# Patient Record
Sex: FEMALE | Race: WHITE | NOT HISPANIC OR LATINO | URBAN - METROPOLITAN AREA
[De-identification: names, ages, dates, MRNs, and addresses within clinical notes are randomized per-mention and may not be internally consistent; named-entity substitution may affect disease eponyms.]

---

## 2024-10-24 ENCOUNTER — INPATIENT (INPATIENT)
Facility: HOSPITAL | Age: 49
LOS: 4 days | Discharge: HOME | DRG: 66 | End: 2024-10-29
Attending: INTERNAL MEDICINE | Admitting: INTERNAL MEDICINE
Payer: COMMERCIAL

## 2024-10-24 VITALS
SYSTOLIC BLOOD PRESSURE: 194 MMHG | HEIGHT: 68 IN | RESPIRATION RATE: 22 BRPM | HEART RATE: 139 BPM | TEMPERATURE: 98 F | DIASTOLIC BLOOD PRESSURE: 104 MMHG | WEIGHT: 248.02 LBS | OXYGEN SATURATION: 100 %

## 2024-10-24 DIAGNOSIS — I63.9 CEREBRAL INFARCTION, UNSPECIFIED: ICD-10-CM

## 2024-10-24 DIAGNOSIS — Z29.9 ENCOUNTER FOR PROPHYLACTIC MEASURES, UNSPECIFIED: ICD-10-CM

## 2024-10-24 DIAGNOSIS — E11.9 TYPE 2 DIABETES MELLITUS WITHOUT COMPLICATIONS: ICD-10-CM

## 2024-10-24 DIAGNOSIS — E66.01 MORBID (SEVERE) OBESITY DUE TO EXCESS CALORIES: ICD-10-CM

## 2024-10-24 DIAGNOSIS — I10 ESSENTIAL (PRIMARY) HYPERTENSION: ICD-10-CM

## 2024-10-24 LAB
ALBUMIN SERPL ELPH-MCNC: 4.4 G/DL — SIGNIFICANT CHANGE UP (ref 3.3–5)
ALP SERPL-CCNC: 75 U/L — SIGNIFICANT CHANGE UP (ref 40–120)
ALT FLD-CCNC: 17 U/L — SIGNIFICANT CHANGE UP (ref 10–45)
ANION GAP SERPL CALC-SCNC: 16 MMOL/L — SIGNIFICANT CHANGE UP (ref 5–17)
APPEARANCE UR: CLEAR — SIGNIFICANT CHANGE UP
APTT BLD: 30.6 SEC — SIGNIFICANT CHANGE UP (ref 24.5–35.6)
AST SERPL-CCNC: 9 U/L — LOW (ref 10–40)
BACTERIA # UR AUTO: ABNORMAL /HPF
BASOPHILS # BLD AUTO: 0.05 K/UL — SIGNIFICANT CHANGE UP (ref 0–0.2)
BASOPHILS NFR BLD AUTO: 0.6 % — SIGNIFICANT CHANGE UP (ref 0–2)
BILIRUB SERPL-MCNC: 0.5 MG/DL — SIGNIFICANT CHANGE UP (ref 0.2–1.2)
BILIRUB UR-MCNC: NEGATIVE — SIGNIFICANT CHANGE UP
BUN SERPL-MCNC: 10 MG/DL — SIGNIFICANT CHANGE UP (ref 7–23)
CALCIUM SERPL-MCNC: 9.6 MG/DL — SIGNIFICANT CHANGE UP (ref 8.4–10.5)
CAST: 0 /LPF — SIGNIFICANT CHANGE UP (ref 0–4)
CHLORIDE SERPL-SCNC: 95 MMOL/L — LOW (ref 96–108)
CO2 SERPL-SCNC: 22 MMOL/L — SIGNIFICANT CHANGE UP (ref 22–31)
COLOR SPEC: YELLOW — SIGNIFICANT CHANGE UP
CREAT SERPL-MCNC: 0.56 MG/DL — SIGNIFICANT CHANGE UP (ref 0.5–1.3)
DIFF PNL FLD: NEGATIVE — SIGNIFICANT CHANGE UP
EGFR: 113 ML/MIN/1.73M2 — SIGNIFICANT CHANGE UP
EOSINOPHIL # BLD AUTO: 0.04 K/UL — SIGNIFICANT CHANGE UP (ref 0–0.5)
EOSINOPHIL NFR BLD AUTO: 0.5 % — SIGNIFICANT CHANGE UP (ref 0–6)
FLUAV AG NPH QL: SIGNIFICANT CHANGE UP
FLUBV AG NPH QL: SIGNIFICANT CHANGE UP
GAS PNL BLDV: SIGNIFICANT CHANGE UP
GLUCOSE BLDC GLUCOMTR-MCNC: 203 MG/DL — HIGH (ref 70–99)
GLUCOSE BLDC GLUCOMTR-MCNC: 287 MG/DL — HIGH (ref 70–99)
GLUCOSE SERPL-MCNC: 467 MG/DL — CRITICAL HIGH (ref 70–99)
GLUCOSE UR QL: >=1000 MG/DL
HCT VFR BLD CALC: 46.7 % — HIGH (ref 34.5–45)
HGB BLD-MCNC: 15 G/DL — SIGNIFICANT CHANGE UP (ref 11.5–15.5)
IMM GRANULOCYTES NFR BLD AUTO: 0.6 % — SIGNIFICANT CHANGE UP (ref 0–0.9)
INR BLD: 1 RATIO — SIGNIFICANT CHANGE UP (ref 0.85–1.16)
KETONES UR-MCNC: 15 MG/DL
LEUKOCYTE ESTERASE UR-ACNC: NEGATIVE — SIGNIFICANT CHANGE UP
LYMPHOCYTES # BLD AUTO: 1.41 K/UL — SIGNIFICANT CHANGE UP (ref 1–3.3)
LYMPHOCYTES # BLD AUTO: 16.5 % — SIGNIFICANT CHANGE UP (ref 13–44)
MCHC RBC-ENTMCNC: 26.1 PG — LOW (ref 27–34)
MCHC RBC-ENTMCNC: 32.1 GM/DL — SIGNIFICANT CHANGE UP (ref 32–36)
MCV RBC AUTO: 81.2 FL — SIGNIFICANT CHANGE UP (ref 80–100)
MONOCYTES # BLD AUTO: 0.47 K/UL — SIGNIFICANT CHANGE UP (ref 0–0.9)
MONOCYTES NFR BLD AUTO: 5.5 % — SIGNIFICANT CHANGE UP (ref 2–14)
NEUTROPHILS # BLD AUTO: 6.52 K/UL — SIGNIFICANT CHANGE UP (ref 1.8–7.4)
NEUTROPHILS NFR BLD AUTO: 76.3 % — SIGNIFICANT CHANGE UP (ref 43–77)
NITRITE UR-MCNC: NEGATIVE — SIGNIFICANT CHANGE UP
NRBC # BLD: 0 /100 WBCS — SIGNIFICANT CHANGE UP (ref 0–0)
PH UR: 6.5 — SIGNIFICANT CHANGE UP (ref 5–8)
PLATELET # BLD AUTO: 263 K/UL — SIGNIFICANT CHANGE UP (ref 150–400)
POTASSIUM SERPL-MCNC: 3.5 MMOL/L — SIGNIFICANT CHANGE UP (ref 3.5–5.3)
POTASSIUM SERPL-SCNC: 3.5 MMOL/L — SIGNIFICANT CHANGE UP (ref 3.5–5.3)
PROT SERPL-MCNC: 7.8 G/DL — SIGNIFICANT CHANGE UP (ref 6–8.3)
PROT UR-MCNC: 100 MG/DL
PROTHROM AB SERPL-ACNC: 11.4 SEC — SIGNIFICANT CHANGE UP (ref 9.9–13.4)
RBC # BLD: 5.75 M/UL — HIGH (ref 3.8–5.2)
RBC # FLD: 14.9 % — HIGH (ref 10.3–14.5)
RBC CASTS # UR COMP ASSIST: 2 /HPF — SIGNIFICANT CHANGE UP (ref 0–4)
RSV RNA NPH QL NAA+NON-PROBE: SIGNIFICANT CHANGE UP
SARS-COV-2 RNA SPEC QL NAA+PROBE: SIGNIFICANT CHANGE UP
SODIUM SERPL-SCNC: 133 MMOL/L — LOW (ref 135–145)
SP GR SPEC: >1.03 — HIGH (ref 1–1.03)
SQUAMOUS # UR AUTO: 5 /HPF — SIGNIFICANT CHANGE UP (ref 0–5)
TROPONIN T, HIGH SENSITIVITY RESULT: <6 NG/L — SIGNIFICANT CHANGE UP (ref 0–51)
UROBILINOGEN FLD QL: 0.2 MG/DL — SIGNIFICANT CHANGE UP (ref 0.2–1)
WBC # BLD: 8.54 K/UL — SIGNIFICANT CHANGE UP (ref 3.8–10.5)
WBC # FLD AUTO: 8.54 K/UL — SIGNIFICANT CHANGE UP (ref 3.8–10.5)
WBC UR QL: 1 /HPF — SIGNIFICANT CHANGE UP (ref 0–5)

## 2024-10-24 PROCEDURE — 70450 CT HEAD/BRAIN W/O DYE: CPT | Mod: 26,MC,59

## 2024-10-24 PROCEDURE — 99231 SBSQ HOSP IP/OBS SF/LOW 25: CPT

## 2024-10-24 PROCEDURE — 99223 1ST HOSP IP/OBS HIGH 75: CPT

## 2024-10-24 PROCEDURE — 93010 ELECTROCARDIOGRAM REPORT: CPT

## 2024-10-24 PROCEDURE — 71046 X-RAY EXAM CHEST 2 VIEWS: CPT | Mod: 26

## 2024-10-24 PROCEDURE — 99285 EMERGENCY DEPT VISIT HI MDM: CPT

## 2024-10-24 PROCEDURE — 70498 CT ANGIOGRAPHY NECK: CPT | Mod: 26,MC

## 2024-10-24 PROCEDURE — 70496 CT ANGIOGRAPHY HEAD: CPT | Mod: 26,MC

## 2024-10-24 RX ORDER — INSULIN LISPRO 100/ML
VIAL (ML) SUBCUTANEOUS
Refills: 0 | Status: DISCONTINUED | OUTPATIENT
Start: 2024-10-24 | End: 2024-10-29

## 2024-10-24 RX ORDER — LISINOPRIL 40 MG
5 TABLET ORAL DAILY
Refills: 0 | Status: DISCONTINUED | OUTPATIENT
Start: 2024-10-24 | End: 2024-10-25

## 2024-10-24 RX ORDER — SODIUM CHLORIDE 9 MG/ML
1000 INJECTION, SOLUTION INTRAMUSCULAR; INTRAVENOUS; SUBCUTANEOUS ONCE
Refills: 0 | Status: COMPLETED | OUTPATIENT
Start: 2024-10-24 | End: 2024-10-24

## 2024-10-24 RX ORDER — CLOPIDOGREL 75 MG/1
75 TABLET ORAL DAILY
Refills: 0 | Status: DISCONTINUED | OUTPATIENT
Start: 2024-10-24 | End: 2024-10-29

## 2024-10-24 RX ORDER — CETIRIZINE HCL 10 MG/1
1 TABLET ORAL
Refills: 0 | DISCHARGE

## 2024-10-24 RX ORDER — MAGNESIUM, ALUMINUM HYDROXIDE 200-200 MG
30 TABLET,CHEWABLE ORAL EVERY 4 HOURS
Refills: 0 | Status: DISCONTINUED | OUTPATIENT
Start: 2024-10-24 | End: 2024-10-29

## 2024-10-24 RX ORDER — LABETALOL HCL 200 MG
10 TABLET ORAL ONCE
Refills: 0 | Status: COMPLETED | OUTPATIENT
Start: 2024-10-24 | End: 2024-10-24

## 2024-10-24 RX ORDER — ONDANSETRON HYDROCHLORIDE 2 MG/ML
4 INJECTION, SOLUTION INTRAMUSCULAR; INTRAVENOUS EVERY 8 HOURS
Refills: 0 | Status: DISCONTINUED | OUTPATIENT
Start: 2024-10-24 | End: 2024-10-29

## 2024-10-24 RX ORDER — INSULIN LISPRO 100/ML
5 VIAL (ML) SUBCUTANEOUS ONCE
Refills: 0 | Status: COMPLETED | OUTPATIENT
Start: 2024-10-24 | End: 2024-10-24

## 2024-10-24 RX ORDER — GLUCAGON INJECTION, SOLUTION 1 MG/.2ML
1 INJECTION, SOLUTION SUBCUTANEOUS ONCE
Refills: 0 | Status: DISCONTINUED | OUTPATIENT
Start: 2024-10-24 | End: 2024-10-29

## 2024-10-24 RX ORDER — INSULIN LISPRO 100/ML
VIAL (ML) SUBCUTANEOUS AT BEDTIME
Refills: 0 | Status: DISCONTINUED | OUTPATIENT
Start: 2024-10-24 | End: 2024-10-26

## 2024-10-24 RX ORDER — NICOTINE POLACRILEX 4 MG/1
1 GUM, CHEWING ORAL DAILY
Refills: 0 | Status: DISCONTINUED | OUTPATIENT
Start: 2024-10-24 | End: 2024-10-29

## 2024-10-24 RX ORDER — ENOXAPARIN SODIUM 40MG/0.4ML
40 SYRINGE (ML) SUBCUTANEOUS EVERY 24 HOURS
Refills: 0 | Status: DISCONTINUED | OUTPATIENT
Start: 2024-10-24 | End: 2024-10-29

## 2024-10-24 RX ORDER — CLOPIDOGREL 75 MG/1
75 TABLET ORAL ONCE
Refills: 0 | Status: COMPLETED | OUTPATIENT
Start: 2024-10-24 | End: 2024-10-24

## 2024-10-24 RX ORDER — ASPIRIN/MAG CARB/ALUMINUM AMIN 325 MG
1 TABLET ORAL
Refills: 0 | DISCHARGE

## 2024-10-24 RX ORDER — ACETAMINOPHEN 500 MG
650 TABLET ORAL EVERY 6 HOURS
Refills: 0 | Status: DISCONTINUED | OUTPATIENT
Start: 2024-10-24 | End: 2024-10-29

## 2024-10-24 RX ORDER — ONDANSETRON HYDROCHLORIDE 2 MG/ML
4 INJECTION, SOLUTION INTRAMUSCULAR; INTRAVENOUS ONCE
Refills: 0 | Status: COMPLETED | OUTPATIENT
Start: 2024-10-24 | End: 2024-10-24

## 2024-10-24 RX ORDER — HYDRALAZINE HYDROCHLORIDE 50 MG/1
10 TABLET, FILM COATED ORAL ONCE
Refills: 0 | Status: COMPLETED | OUTPATIENT
Start: 2024-10-24 | End: 2024-10-24

## 2024-10-24 RX ORDER — MELATONIN 5 MG
3 TABLET ORAL AT BEDTIME
Refills: 0 | Status: DISCONTINUED | OUTPATIENT
Start: 2024-10-24 | End: 2024-10-29

## 2024-10-24 RX ORDER — ASPIRIN/MAG CARB/ALUMINUM AMIN 325 MG
81 TABLET ORAL DAILY
Refills: 0 | Status: DISCONTINUED | OUTPATIENT
Start: 2024-10-24 | End: 2024-10-29

## 2024-10-24 RX ADMIN — ONDANSETRON HYDROCHLORIDE 4 MILLIGRAM(S): 2 INJECTION, SOLUTION INTRAMUSCULAR; INTRAVENOUS at 21:13

## 2024-10-24 RX ADMIN — Medication 1: at 23:39

## 2024-10-24 RX ADMIN — SODIUM CHLORIDE 1000 MILLILITER(S): 9 INJECTION, SOLUTION INTRAMUSCULAR; INTRAVENOUS; SUBCUTANEOUS at 11:39

## 2024-10-24 RX ADMIN — Medication 80 MILLIGRAM(S): at 23:40

## 2024-10-24 RX ADMIN — SODIUM CHLORIDE 1000 MILLILITER(S): 9 INJECTION, SOLUTION INTRAMUSCULAR; INTRAVENOUS; SUBCUTANEOUS at 19:00

## 2024-10-24 RX ADMIN — ONDANSETRON HYDROCHLORIDE 4 MILLIGRAM(S): 2 INJECTION, SOLUTION INTRAMUSCULAR; INTRAVENOUS at 11:39

## 2024-10-24 RX ADMIN — Medication 10 MILLIGRAM(S): at 18:06

## 2024-10-24 RX ADMIN — CLOPIDOGREL 75 MILLIGRAM(S): 75 TABLET ORAL at 15:46

## 2024-10-24 RX ADMIN — Medication 10 MILLIGRAM(S): at 19:23

## 2024-10-24 RX ADMIN — Medication 5 MILLIGRAM(S): at 23:40

## 2024-10-24 RX ADMIN — Medication 3 MILLIGRAM(S): at 23:40

## 2024-10-24 RX ADMIN — Medication 5 UNIT(S): at 18:35

## 2024-10-24 RX ADMIN — HYDRALAZINE HYDROCHLORIDE 10 MILLIGRAM(S): 50 TABLET, FILM COATED ORAL at 20:23

## 2024-10-24 NOTE — ED PROVIDER NOTE - PROGRESS NOTE DETAILS
Dr. Jenifer Barnhart, ATTENDING: Patient reassessed at bedside.  No new acute complaints.   CT results discussed. Neurology consulted. will await recommendations  will hold off on BP medications until neuro evaluates pt. Received signout pending CT read and neuro recs to be finalized briefly 48-year-old with hypertension diabetes untreated presented with ataxia and slurred speech outside of the thrombolytic window - Duarte Guerra MD attending physician

## 2024-10-24 NOTE — ED PROVIDER NOTE - CLINICAL SUMMARY MEDICAL DECISION MAKING FREE TEXT BOX
Braulio Irving MD PGY-3:  48-year-old female with PMH prediabetes, hypertension (never on antihypertensive medications) presents with 2 days of slurred speech.   at bedside also endorses that the patient speech is not as clear as it normally is.  Patient also states that she feels like she is leaning towards the left side when she walks and feels little off balance for the past 2 days since Tuesday.   thinks that patient has a sinus infection that she went to urgent care again on Sunday with some kids and had some congestion.  Patient has not seen a primary care doctor in years but made an appointment for this following Monday.  Patient also endorses some trouble swallowing since Tuesday.  Denies odynophagia.  Denies fever, chills, cough, chest pain, shortness of breath, vomiting, abdominal pain, dysuria, constipation, diarrhea.  Endorses nausea after eating meals since Tuesday.  Denies abdominal pain. Smokes 3 cigarettes daily no alcohol or other drug use.    GENERAL: well appearing in no acute distress, non-toxic appearing  HEAD: normocephalic, atraumatic  HEENT: normal conjunctiva, oral mucosa moist, uvula midline, no tonsilar exudates, slightly erythematous posterior oropharynx  CARDIAC: tachycardic rate and reg rhythm, normal S1S2, no appreciable murmurs, 2+ pulses in UE b/l  PULM: normal breath sounds, clear to ascultation bilaterally, no rales, rhonchi, wheezing  GI: abdomen nondistended, soft, nontender, no guarding, rebound tenderness  :  no suprapubic tenderness  NEURO: no gross motor or sensory deficits, CN2-12 grossly intact, speech appears normal but different per patient and , PERRL, EOMI, normal gait, AAOx3, no dysmetria, 5/5 strength in UE and LE b/l  MSK: no peripheral edema, no calf tenderness b/l  SKIN: well-perfused, extremities warm, no visible rashes  PSYCH: appropriate mood and affect    Pt likely has untreated hypertension will workup for hypertensive emergency. Will obtain CTH imaging given slurred speech symptoms. No facial droop noticed by me nor the  at bedside. Will obtain infectious workup with viral swab, cxr, urine. Likely neuro consult. Dispo pending results.

## 2024-10-24 NOTE — CONSULT NOTE ADULT - ASSESSMENT
47 yo right handed female with PMH of prediabetes and HTN not on medications presents with slurred speech, left hand weakness, loss of balance with falling to the left, and choking on liquids since waking up 2 days ago. Symptoms have gradually improved and now only complains of mild slurred speech, difficulty swallowing, and left finger clumsiness. Also noticed some difficulty with reading where she would skip over some words when reading fast. No gait disturbance Patient denies history of stroke or similar symptoms. Patient is a currently smoker for 20 years, 5-6 cigarettes a day. She has not followed up with a PCP in a long time to check her A1c or BP. CTH shows subacute/acute R basal ganglia stroke and L pontine stroke. CTA shows no LVO or significant stenosis.      mRS: 0  LKN: 10/21  NIHSS: 0    Impression: Patient presents with dysarthria, dysphagia, L hand weakness, and ataxia vs mild LLE weakness due to acute R basal ganglia ischemic stroke. Mechanism likely small vessel disease.    Recommendations:  [] Patient can complete rest of workup outpatient  [] MRI brain without contrast   [] TTE w/ bubble   [] Lipid panel, HbA1c  [] CBC, CMP, coagulation panel, troponin  [] ASA 81 mg and plavix 75mg QD for 21 days then continue with aspirin only  [] Atorvastatin 80mg (titrate to LDL < 70)   []  on smoking cessation  [] Lovenox SQ for DVT prophylaxis   [] NPO unless passes dysphagia screen; swallow eval if fails  [] BP goal normotension at <130/80  [] Telemonitoring  [] Neurological checks and vital signs Q4  [] BGM goals 140-180  [] PT/OT; S/S evaluation    * Case and plan not final until Attending attestation * 47 yo right handed female with PMH of prediabetes and HTN not on medications presents with slurred speech, left hand weakness, loss of balance with falling to the left, and choking on liquids since waking up 2 days ago. Symptoms have gradually improved and now only complains of mild slurred speech, difficulty swallowing, and left finger clumsiness. Also noticed some difficulty with reading where she would skip over some words when reading fast. No gait disturbance Patient denies history of stroke or similar symptoms. Patient is a currently smoker for 20 years, 5-6 cigarettes a day. She has not followed up with a PCP in a long time to check her A1c or BP. CTH shows subacute/acute R basal ganglia stroke and L pontine stroke. CTA shows no LVO or significant stenosis.      mRS: 0  LKN: 10/21  NIHSS: 0    Impression: Patient presents with dysarthria, dysphagia, L hand weakness, and ataxia vs mild LLE weakness due to acute R basal ganglia ischemic stroke. Mechanism likely small vessel disease.    Recommendations:  [] MRI brain without contrast   [] TTE w/ bubble   [] Lipid panel, HbA1c  [] CBC, CMP, coagulation panel, troponin  [] ASA 81 mg and plavix 75mg QD for 21 days then continue with aspirin only  [] Atorvastatin 80mg (titrate to LDL < 70)   []  on smoking cessation  [] Lovenox SQ for DVT prophylaxis   [] NPO unless passes dysphagia screen; swallow eval if fails  [] BP goal normotension at <130/80  [] Telemonitoring  [] Neurological checks and vital signs Q4  [] BGM goals 140-180  [] PT/OT; S/S evaluation    * Case and plan not final until Attending attestation * 47 yo right handed female with PMH of prediabetes and HTN not on medications presents with slurred speech, left hand weakness, loss of balance with falling to the left, and choking on liquids since waking up 2 days ago. Symptoms have gradually improved and now only complains of mild slurred speech, difficulty swallowing, and left finger clumsiness. Also noticed some difficulty with reading where she would skip over some words when reading fast. No gait disturbance Patient denies history of stroke or similar symptoms. Patient is a currently smoker for 20 years, 5-6 cigarettes a day. She has not followed up with a PCP in a long time to check her A1c or BP. CTH shows subacute/acute R basal ganglia stroke and L pontine stroke. CTA shows no LVO or significant stenosis.      mRS: 0  LKN: 10/21  NIHSS: 0    Impression: Patient presents with dysarthria, dysphagia, L hand weakness, and ataxia vs mild LLE weakness due to acute R basal ganglia ischemic stroke. Mechanism likely small vessel disease.    Recommendations:  [] MRI brain without contrast   [] TTE w/ bubble   [] Lipid panel, HbA1c  [] CBC, CMP, coagulation panel, troponin  [] Load plavix 300mg now  [] ASA 81 mg and plavix 75mg QD for 21 days then continue with aspirin only  [] Atorvastatin 80mg (titrate to LDL < 70)   []  on smoking cessation  [] Lovenox SQ for DVT prophylaxis   [] NPO unless passes dysphagia screen; swallow eval if fails  [] BP goal normotension at <130/80  [] Telemonitoring  [] Neurological checks and vital signs Q4  [] BGM goals 140-180  [] PT/OT; S/S evaluation    * Case and plan not final until Attending attestation * 47 yo right handed female with PMH of prediabetes and HTN not on medications presents with slurred speech, left hand weakness, loss of balance with falling to the left, and choking on liquids since waking up 2 days ago. Symptoms have gradually improved and now only complains of mild slurred speech, difficulty swallowing, and left finger clumsiness. Also noticed some difficulty with reading where she would skip over some words when reading fast. No gait disturbance Patient denies history of stroke or similar symptoms. Patient is a currently smoker for 20 years, 5-6 cigarettes a day. She has not followed up with a PCP in a long time to check her A1c or BP. CTH shows subacute/acute R basal ganglia stroke and L pontine stroke. CTA shows no LVO or significant stenosis.      mRS: 0  LKN: 10/21  NIHSS: 0    Impression: Patient presents with dysarthria, dysphagia, L hand weakness, and ataxia vs mild LLE weakness due to acute R basal ganglia ischemic stroke. Mechanism likely small vessel disease.    Recommendations:  [] MRI brain without contrast   [] TTE w/ bubble   [] Lipid panel, HbA1c  [] CBC, CMP, coagulation panel, troponin  [] Load plavix 300mg now  [] ASA 81 mg and plavix 75mg QD for 21 days then continue with aspirin only  [] Atorvastatin 80mg (titrate to LDL < 70)   []  on smoking cessation  [] Lovenox SQ for DVT prophylaxis   [] NPO unless passes dysphagia screen; swallow eval if fails  [] BP goal normotension at <130/80  [] Telemonitoring  [] Neurological checks and vital signs Q4  [] BGM goals 140-180  [] PT/OT; S/S evaluation    Discussed with stroke fellow Gui Fay. Case and plan not final until Attending attestation *

## 2024-10-24 NOTE — ED ADULT NURSE NOTE - NSFALLUNIVINTERV_ED_ALL_ED
Bed/Stretcher in lowest position, wheels locked, appropriate side rails in place/Call bell, personal items and telephone in reach/Instruct patient to call for assistance before getting out of bed/chair/stretcher/Non-slip footwear applied when patient is off stretcher/[place] to call system/Physically safe environment - no spills, clutter or unnecessary equipment/Purposeful proactive rounding/Room/bathroom lighting operational, light cord in reach

## 2024-10-24 NOTE — ED ADULT NURSE NOTE - CHIEF COMPLAINT QUOTE
slurred speech, off balance x few days, recently c/o sinus infection, L facial droop at rest, no pronator drift, maex4, strong, steady gait, +smoker

## 2024-10-24 NOTE — ED ADULT NURSE REASSESSMENT NOTE - NS ED NURSE REASSESS COMMENT FT1
Flow RN Merlyn made aware about pt bp being elevated at this time and unstable to be moved to [place]. ED team made aware. Awaiting orders. Pt denying any symptoms of c/p, SOB, HA, dizziness, n/v/d. numbness, tingling. Pt only endorsing slurred speech which was her reason for ER visit.
Pt remians elevated post bp meds. MD Aristides Ramsey made aware. Awaiting orders at this time. Awaiting pt bp to become stable in order to move to [facility]

## 2024-10-24 NOTE — H&P ADULT - HISTORY OF PRESENT ILLNESS
48y F pmh prediabetes and HTN not on medications p/w slurred speech, left hand weakness, loss of balance with falling to the left, and choking on liquids since waking up 2 days ago. Symptoms have gradually improved and now only complains of mild slurred speech, difficulty swallowing, and left finger clumsiness. Also noticed some difficulty with reading where she would skip over some words when reading fast. No gait disturbance. Has not seen a PCP or medical check up in a long time. Patient denies history of stroke or similar symptoms. Patient is a currently smoker for 20 years, 5-6 cigarettes a day.  48y F pmh prediabetes and HTN not on medications p/w slurred speech, left hand weakness, loss of balance with falling to the left, and choking on liquids since waking up 2 days ago. Symptoms have gradually improved and now only complains of mild slurred speech, difficulty swallowing, and left finger clumsiness. Also noticed some difficulty with reading, skips over some words when reading fast. No gait disturbance. Has not seen a PCP or gone for medical check up in a long time. Patient denies history of stroke or similar symptoms. Patient is a current smoker for 20 years, 5-6 cigarettes a day.     ROS: Denies HA, CP, SOB, palpitation, N/V/D, fever, cough, chills, dizziness, numbness, tingling, weakness  A 10-system ROS was performed and is negative except as noted above and/or in the HPI.    ED: Eval'd by neuro. Labs and imaging done. EKG NSR, CTH -> right basal ganglia infarct, age indeterminant left lacunar infarct. Given  IVF, Plavix, Insulin for hyperglycemia. Tele

## 2024-10-24 NOTE — H&P ADULT - PROBLEM SELECTOR PLAN 2
- hx/o preDM p/w elevated serum glucose > 400. Pt likely w/new dx'd DM2   - Low ISS ACHS  - DASH/CC diet  - F/u A1c

## 2024-10-24 NOTE — ED ADULT NURSE NOTE - OBJECTIVE STATEMENT
48y F A&O x3 ambulatory with  bedside and anxious, Presenting to the ER endorsing slurred speech, nausea and difficulty swallowing when drinking with some SOB and balance difficulties. Pt states Tuesday she had sudden onset of these symptoms and has been continuous  since onset. "Anything I drink I choke on. I feel like I am not getting enough air when talking and I have to take more deep breaths. Pt also endorsed balance being off and having to hold to  at times while walking.

## 2024-10-24 NOTE — H&P ADULT - NSHPPHYSICALEXAM_GEN_ALL_CORE
T(C): 36.9 (10-24-24 @ 17:41), Max: 37.3 (10-24-24 @ 11:56)  HR: 89 (10-24-24 @ 20:00) (88 - 139)  BP: 187/107 (10-24-24 @ 20:00) (158/121 - 220/128)  RR: 20 (10-24-24 @ 20:00) (20 - 22)  SpO2: 99% (10-24-24 @ 20:00) (97% - 100%)    CONSTITUTIONAL: Well groomed, no apparent distress  EYES: PERRLA , EOMI  ENMT: MMM. Normal dentition  RESP: No respiratory distress, CTA b/l  CV: +S1S2, RRR, no MRG; no peripheral edema  GI: Soft, NTND  MSK: Normal gait; normal pain free ROM x4 extremities   SKIN: No rashes  NEURO: CN II-XII grossly intact; sensation intact throughout   PSYCH: A+O x 3, mood and affect appropriate

## 2024-10-24 NOTE — H&P ADULT - NSHPLABSRESULTS_GEN_ALL_CORE
15.0   8.54  )-----------( 263      ( 24 Oct 2024 12:14 )             46.7     10-24    133[L]  |  95[L]  |  10  ----------------------------<  467[HH]  3.5   |  22  |  0.56    Ca    9.6      24 Oct 2024 12:14    TPro  7.8  /  Alb  4.4  /  TBili  0.5  /  DBili  x   /  AST  9[L]  /  ALT  17  /  AlkPhos  75  10-24      Troponin T, High Sensitivity Result: <6: ng/L (10.24.24 @ 12:14)    FluA/FluB/RSV/COVID PCR (10.24.24 @ 12:13)    SARS-CoV-2 Result: NotDetec    Influenza A Result: NotDetec    Influenza B Result: NotDetec    Resp Syn Virus Result: NotDetec    Urinalysis + Microscopic Examination (10.24.24 @ 12:14)    pH Urine: 6.5    Urine Appearance: Clear    Color: Yellow    Specific Gravity: >1.030    Protein, Urine: 100 mg/dL    Glucose Qualitative, Urine: >=1000 mg/dL    Ketone - Urine: 15 mg/dL    Blood, Urine: Negative    Bilirubin: Negative    Urobilinogen: 0.2 mg/dL    Leukocyte Esterase Concentration: Negative    Nitrite: Negative    White Blood Cell - Urine: 1 /HPF    Red Blood Cell - Urine: 2 /HPF    Bacteria: Occasional /HPF    Cast: 0 /LPF    Epithelial Cells: 5 /HPF      - - - - - - - - - - - - - - - - - - - - - - - - - - - - - - - - - - - - - - - - - - - - - - - - - - - -       EKG PERSONALLY REVIEWED:    Sinus tachy 116 bpm        IMAGES PERSONALLY REVIEWED:     < from: Xray Chest 2 Views PA/Lat (10.24.24 @ 12:05) >  IMPRESSION: Clear lungs.      < from: CT Angio Head w/ IV Cont (10.24.24 @ 13:02) >  IMPRESSION:      1.   Right carotid system:  No hemodynamically significant stenosis.        2.   Left carotid system:  No hemodynamically significant stenosis.        3.   Intracranial circulation:  No significant vascular lesion.        4.   Brain:  acute/subacute 1.8 cm infarction within the RIGHT basal   ganglia.  No associated hemorrhage seen.  Age indeterminate 5 mm lacunar   infarction in the LEFT kisha.

## 2024-10-24 NOTE — ED PROVIDER NOTE - ATTENDING CONTRIBUTION TO CARE
48-year-old female with PMH prediabetes, hypertension (never on antihypertensive medications) presents with 2 days of slurred speech and ataxia.  Patient reports she feels like she is leaning towards the left side when she walks and feels little off balance. Patient has not seen a primary care doctor in years but made an appointment for this following Monday. Denies fever, chills, cough, chest pain, shortness of breath, vomiting, abdominal pain, dysuria, constipation, diarrhea.    Physical Exam:  Gen: NAD, awake and alert, non-toxic appearing  HEENT: Atraumatic, oropharynx clear, moist mucous membranes, normal conjunctiva  Cardio: tachycardia, regular rhythm, no murmurs, rubs or gallops  Lung: CTAB, no respiratory distress, no wheezes/rhonchi/rales B/L  Abd: soft, NT, ND, no guarding, no rigidity, no rebound tenderness, no CVA tenderness   MSK: no visible deformities, ROMx4   Neuro:   Alert and oriented, no focal deficits, no motor or sensory deficits.   CN II-XII intact.  Cerebellar function normal.   MOTOR STRENGTH: equal, symmetrical, and 5/5 in LUE, RUE, LLE, RLE.   Deep tendon reflexes: 2/4 throughout.   Finger to nose normal. Station & Gait: Gait normal. Romberg negative.   SENSORY: equal b/l to light tough throughout.   Skin: Warm, well perfused, no rash, no leg swelling     Patient presents for subjective slurred speech and ataxia x 2 days. Unremarkable normal examination.  NIHSS of 0. Patient hypertensive >160s/120s, concern for hypertensive emergency, ICH, CVA, Acute kidney injury, electrolyte abnormality, Metabolic derangements.   No facial droop noticed by me nor the  at bedside.

## 2024-10-24 NOTE — ED ADULT NURSE NOTE - NURSING MUSC ROM
Dr Samuel called aware of glucose 53.  Plan of care to continue to monitor pt on postpartum unit.   full range of motion in all extremities

## 2024-10-24 NOTE — CHART NOTE - NSCHARTNOTEFT_GEN_A_CORE
CC: elevated BP      HPI:  Called by RN to evaluate patient for elevated BP, noted to be 199/98 with repeat /107. Patient seen and assessed at bedside in [facility] 1 section of ED; she is A&O x3, NAD. Patient denied headache, dizziness, chest pain, shortness of breath, abdominal pain, vomiting, or diarrhea. She endorsed nausea and endorsed that it was relieved with Zofran earlier.         ROS:  CONSTITUTIONAL:  No fever, chills, rigors  CARDIOVASCULAR:  No chest pain or palpitations  RESPIRATORY:   No SOB, cough, wheezing  GASTROINTESTINAL:  (+) nausea. NO abd pain, V/D  NEUROLOGIC:  No HA, visual disturbances  SKIN: No rashes        PAST MEDICAL & SURGICAL HISTORY:  No pertinent past medical history              Vital Signs Last 24 Hrs  T(C): 36.9 (24 Oct 2024 17:41), Max: 37.3 (24 Oct 2024 11:56)  T(F): 98.4 (24 Oct 2024 17:41), Max: 99.2 (24 Oct 2024 11:56)  HR: 89 (24 Oct 2024 20:00) (88 - 139)  BP: 187/107 (24 Oct 2024 20:00) (158/121 - 220/128)  RR: 20 (24 Oct 2024 20:00) (20 - 22)  SpO2: 99% (24 Oct 2024 20:00) (97% - 100%)    Parameters below as of 24 Oct 2024 20:00  Patient On (Oxygen Delivery Method): room air          Physical Exam:  General: WN/WD sitting up in stretcher, NAD, AOx3, nontoxic appearing  Head:  NC/AT  CV: RRR, S1S2   Respiratory: CTA B/L, nonlabored on room air  Abdominal: (+) bowel sounds x4. Large, soft abdomen, NT, no guarding or rebound tenderness  MSK: No BLLE edema, + peripheral pulses, FROM all 4 extremity  Skin: (+) warm, dry   Psych: Appropriate affect         Labs:                        15.0   8.54  )-----------( 263      ( 24 Oct 2024 12:14 )             46.7     10-24    133[L]  |  95[L]  |  10  ----------------------------<  467[HH]  3.5   |  22  |  0.56    Ca    9.6      24 Oct 2024 12:14    TPro  7.8  /  Alb  4.4  /  TBili  0.5  /  DBili  x   /  AST  9[L]  /  ALT  17  /  AlkPhos  75  10-24      Urinalysis Basic - ( 10-24 @ 12:14 )  Color: Occasional / Appearance: Negative / SG: -- / pH: >=1000  Gluc: Negative / Ketone: Yellow  / Bili: -- / Urobili: --   Blood: 5 / Protein: -- / Nitrite: 15   Leuk Esterase: Negative / RBC: >1.030 / WBC --   Sq Epi: 100 / Non Sq Epi: 2 / Bacteria: 6.5          Radiology:  < from: CT Angio Neck w/ IV Cont (10.24.24 @ 13:02) >  1.   Right carotid system:  No hemodynamically significant stenosis.      2.   Left carotid system:  No hemodynamically significant stenosis.      3.   Intracranial circulation:  No significant vascular lesion.      4.   Brain:  acute/subacute 1.8 cm infarction within the RIGHT basal   ganglia.  No associated hemorrhage seen.  Age indeterminate 5 mm lacunar   infarction in the LEFT kisha.  < end of copied text >              Assessment & Plan:  47 yo right handed female with PMH of prediabetes and HTN not on medications presents with slurred speech, left hand weakness, loss of balance with falling to the left, and choking on liquids since waking up 2 days ago.      #Hypertensive urgency  -  -  -Will continue to closely monitor patient/vitals  -Will discuss with day team, attending to follow         Valerio Delcid PA-C  Dept of Medicine  [id number]      Time-based billing (NON-critical care).   ____ minutes spent on total encounter. The necessity of the time spent during the encounter on this date of service was due to:   Need to interview and examine patient and family, coordinate care with hospitalist, place orders, document, personally review labs, and review prior medical records. CC: elevated BP      HPI:  Called by RN to evaluate patient for elevated BP, noted to be 199/98 with repeat /107. Patient seen and assessed at bedside in [facility] section of ED; she is A&O x3, NAD. Patient denied headache, dizziness, chest pain, shortness of breath, abdominal pain, vomiting, or diarrhea. She endorsed nausea and stated that it was relieved with Zofran she received earlier. Patient denies a history of hypertension or taking any anti-hypertensives at home. Labs notable for hyperglycemia, serum glucose 467 on admission. Patient endorses she was diagnosed with "prediabetes" approximately 5 years ago when she had ankle surgery. Since admission, patient has received Labetalol 10mg IVP x2 for elevated BP.         ROS:  CONSTITUTIONAL:  No fever, chills, rigors  CARDIOVASCULAR:  No chest pain or palpitations  RESPIRATORY:   No SOB, cough, wheezing  GASTROINTESTINAL:  (+) nausea. NO abd pain, vomiting, diarrhea  NEUROLOGIC:  No HA, visual disturbances  SKIN: No rashes        PAST MEDICAL & SURGICAL HISTORY:  No pertinent past medical history              Vital Signs Last 24 Hrs  T(C): 36.9 (24 Oct 2024 17:41), Max: 37.3 (24 Oct 2024 11:56)  T(F): 98.4 (24 Oct 2024 17:41), Max: 99.2 (24 Oct 2024 11:56)  HR: 89 (24 Oct 2024 20:00) (88 - 139)  BP: 187/107 (24 Oct 2024 20:00) (158/121 - 220/128)  RR: 20 (24 Oct 2024 20:00) (20 - 22)  SpO2: 99% (24 Oct 2024 20:00) (97% - 100%)    Parameters below as of 24 Oct 2024 20:00  Patient On (Oxygen Delivery Method): room air          Physical Exam:  General: WN/WD sitting up in stretcher, NAD, AOx3, nontoxic appearing  Mental status: Awake, alert, and in no apparent distress. Oriented to person, place and time. Language function is normal. Recent memory and concentration were normal.   Head:  NC/AT  Cranial Nerves: PERRL. EOMI. Facial sensation was intact to light touch. There was no facial asymmetry.   CV: RRR, S1S2   Respiratory: CTA B/L, nonlabored on room air  Abdominal: (+) bowel sounds x4. Large, soft abdomen, NT, no guarding or rebound tenderness  MSK: No BLLE edema, + peripheral pulses, FROM all 4 extremity  Motor exam: Bulk and tone were normal. Strength was 5/5 in all four extremities.   Skin: (+) warm, dry   Psych: Appropriate affect   Gait: Deferred        Labs:                        15.0   8.54  )-----------( 263      ( 24 Oct 2024 12:14 )             46.7     10-24    133[L]  |  95[L]  |  10  ----------------------------<  467[HH]  3.5   |  22  |  0.56    Ca    9.6      24 Oct 2024 12:14    TPro  7.8  /  Alb  4.4  /  TBili  0.5  /  DBili  x   /  AST  9[L]  /  ALT  17  /  AlkPhos  75  10-24      Urinalysis Basic - ( 10-24 @ 12:14 )  Color: Occasional / Appearance: Negative / SG: -- / pH: >=1000  Gluc: Negative / Ketone: Yellow  / Bili: -- / Urobili: --   Blood: 5 / Protein: -- / Nitrite: 15   Leuk Esterase: Negative / RBC: >1.030 / WBC --   Sq Epi: 100 / Non Sq Epi: 2 / Bacteria: 6.5      CAPILLARY BLOOD GLUCOSE  POCT Blood Glucose.: 287 mg/dL (24 Oct 2024 22:49)  POCT Blood Glucose.: 203 mg/dL (24 Oct 2024 18:34)          Radiology:  < from: CT Angio Neck w/ IV Cont (10.24.24 @ 13:02) >  1.   Right carotid system:  No hemodynamically significant stenosis.      2.   Left carotid system:  No hemodynamically significant stenosis.      3.   Intracranial circulation:  No significant vascular lesion.      4.   Brain:  acute/subacute 1.8 cm infarction within the RIGHT basal   ganglia.  No associated hemorrhage seen.  Age indeterminate 5 mm lacunar   infarction in the LEFT kisha.  < end of copied text >              Assessment & Plan:  47 yo right handed female with PMH of prediabetes and HTN not on medications presents with slurred speech, left hand weakness, loss of balance with falling to the left, and choking on liquids since waking up 2 days ago.    Patient now presenting acutely with hypertensive urgency with -190s. Chart review notable for -220s since admission. Patient has received Labetalol 10mg IVP x2 since admission.       #Hypertensive urgency ?2/2 undiagnosed HTN  -Vital signs notable for -190s, patient is asymptomatic  -No focal deficits on exam  -Hydralazine 10mg IVP x1 given with improvement in BP to 152/62 (manual BP)  -CTH on admission noted with acute/subacute right basal ganglia CVA and left lacunar infarct   -Goal BP <130/80 per neurology recommendations  -Patient will likely require initiation of oral anti-hypertensive medications  -Vital signs Q4h  -Neuro checks Q4h  -Continue with Aspirin, Plavix, and statin per neurology recommendations given finding of CVA on CTH  -TTE pending  -MRI brain pending  -Monitor on telemetry  -Consider cardiology evaluation if indicated  -Will continue to closely monitor patient/vitals  -Will discuss with day team, attending to follow       #Hyperglycemia likely due to undiagnosed Type 2 DM  -Serum glucose 467 on admission  -Patient received Admelog 5 units x1 at 18:35 with improvement in fingerstick glucose to 203mg/dL  -Follow up A1c in AM  -Will start ISS TID and HS  -Monitor fingersticks TID AC and HS  -Carb consistent diet  -Consider endocrinology evaluation if indicated  -Will continue to closely monitor patient/vitals      Valerio Delcid PA-C  Dept of Medicine  [id number]      Time-based billing (NON-critical care).   42 minutes spent on total encounter. The necessity of the time spent during the encounter on this date of service was due to:   Need to interview and examine patient, coordinate care with RN, place orders, document, personally review labs, and review prior medical records. CC: elevated BP      HPI:  Called by RN to evaluate patient for elevated BP, noted to be 199/98 with repeat /107. Patient seen and assessed at bedside in [facility] section of ED; she is A&O x3, NAD. Patient denied headache, dizziness, chest pain, shortness of breath, abdominal pain, vomiting, or diarrhea. She endorsed nausea and stated that it was relieved with Zofran she received earlier. Patient denies a history of hypertension or taking any anti-hypertensives at home. Labs notable for hyperglycemia, serum glucose 467 on admission. Patient endorses she was diagnosed with "prediabetes" approximately 5 years ago when she had ankle surgery. Since admission, patient has received Labetalol 10mg IVP x2 for elevated BP.         ROS:  CONSTITUTIONAL:  No fever, chills, rigors  CARDIOVASCULAR:  No chest pain or palpitations  RESPIRATORY:   No SOB, cough, wheezing  GASTROINTESTINAL:  (+) nausea. NO abd pain, vomiting, diarrhea  NEUROLOGIC:  No HA, visual disturbances  SKIN: No rashes        PAST MEDICAL & SURGICAL HISTORY:  No pertinent past medical history              Vital Signs Last 24 Hrs  T(C): 36.9 (24 Oct 2024 17:41), Max: 37.3 (24 Oct 2024 11:56)  T(F): 98.4 (24 Oct 2024 17:41), Max: 99.2 (24 Oct 2024 11:56)  HR: 89 (24 Oct 2024 20:00) (88 - 139)  BP: 187/107 (24 Oct 2024 20:00) (158/121 - 220/128)  RR: 20 (24 Oct 2024 20:00) (20 - 22)  SpO2: 99% (24 Oct 2024 20:00) (97% - 100%)    Parameters below as of 24 Oct 2024 20:00  Patient On (Oxygen Delivery Method): room air          Physical Exam:  General: WN/WD sitting up in stretcher, NAD, AOx3, nontoxic appearing  Mental status: Awake, alert, and in no apparent distress. Oriented to person, place and time. Language function is normal. Recent memory and concentration were normal.   Head:  NC/AT  Cranial Nerves: PERRL. EOMI. Facial sensation was intact to light touch. There was no facial asymmetry.   CV: RRR, S1S2   Respiratory: CTA B/L, nonlabored on room air  Abdominal: (+) bowel sounds x4. Large, soft abdomen, NT, no guarding or rebound tenderness  MSK: No BLLE edema, + peripheral pulses, FROM all 4 extremity  Motor exam: Bulk and tone were normal. Strength was 5/5 in all four extremities.   Skin: (+) warm, dry   Psych: Appropriate affect   Gait: Deferred        Labs:                        15.0   8.54  )-----------( 263      ( 24 Oct 2024 12:14 )             46.7     10-24    133[L]  |  95[L]  |  10  ----------------------------<  467[HH]  3.5   |  22  |  0.56    Ca    9.6      24 Oct 2024 12:14    TPro  7.8  /  Alb  4.4  /  TBili  0.5  /  DBili  x   /  AST  9[L]  /  ALT  17  /  AlkPhos  75  10-24      Urinalysis Basic - ( 10-24 @ 12:14 )  Color: Occasional / Appearance: Negative / SG: -- / pH: >=1000  Gluc: Negative / Ketone: Yellow  / Bili: -- / Urobili: --   Blood: 5 / Protein: -- / Nitrite: 15   Leuk Esterase: Negative / RBC: >1.030 / WBC --   Sq Epi: 100 / Non Sq Epi: 2 / Bacteria: 6.5      CAPILLARY BLOOD GLUCOSE  POCT Blood Glucose.: 287 mg/dL (24 Oct 2024 22:49)  POCT Blood Glucose.: 203 mg/dL (24 Oct 2024 18:34)          Radiology:  < from: CT Angio Neck w/ IV Cont (10.24.24 @ 13:02) >  1.   Right carotid system:  No hemodynamically significant stenosis.      2.   Left carotid system:  No hemodynamically significant stenosis.      3.   Intracranial circulation:  No significant vascular lesion.      4.   Brain:  acute/subacute 1.8 cm infarction within the RIGHT basal   ganglia.  No associated hemorrhage seen.  Age indeterminate 5 mm lacunar   infarction in the LEFT kisha.  < end of copied text >              Assessment & Plan:  47 yo right handed female with PMH of prediabetes and HTN not on medications presents with slurred speech, left hand weakness, loss of balance with falling to the left, and choking on liquids since waking up 2 days ago.    Patient now presenting acutely with hypertensive urgency with -190s. Chart review notable for -220s since admission. Patient has received Labetalol 10mg IVP x2 since admission.       #Hypertensive urgency ?2/2 undiagnosed HTN  -Vital signs notable for -190s, patient is asymptomatic  -No focal deficits on exam  -Hydralazine 10mg IVP x1 given with improvement in BP to 152/62 (manual BP)  -CTH on admission noted with acute/subacute right basal ganglia CVA and left lacunar infarct   -Goal BP <130/80 per neurology recommendations  -Patient will likely require initiation of oral anti-hypertensive medications  -Vital signs Q4h  -Neuro checks Q4h  -Continue with Aspirin, Plavix, and statin per neurology recommendations given finding of CVA on CTH  -TTE pending  -MRI brain pending  -Monitor on telemetry  -Consider cardiology evaluation if indicated  -Will continue to closely monitor patient/vitals  -Will discuss with day team, attending to follow       #Hyperglycemia likely due to undiagnosed Type 2 DM  -Serum glucose 467 on admission  -Patient received Admelog 5 units x1 at 18:35 with improvement in fingerstick glucose to 203mg/dL  -Follow up A1c in AM  -Will start ISS TID and HS  -Monitor fingersticks TID AC and HS  -Carb consistent diet  -Consider endocrinology evaluation if indicated  -Will continue to closely monitor patient/vitals      Valerio Delcid PA-C  Dept of Medicine  [id number]      Time-based billing (NON-critical care).   32 minutes spent on total encounter. The necessity of the time spent during the encounter on this date of service was due to:   Need to interview and examine patient, coordinate care with RN, place orders, document, personally review labs, and review prior medical records.

## 2024-10-24 NOTE — H&P ADULT - PROBLEM SELECTOR PLAN 3
- BP control, goal <130/80    - Start Lisinopril 5mg  qd  - Start HTZ 12.5mg qd  - Titrate BP meds prn

## 2024-10-24 NOTE — CONSULT NOTE ADULT - SUBJECTIVE AND OBJECTIVE BOX
Neurology - Consult Note    Spectra: [phone] ([facility]), [phone] ([facility])    HPI: 47 yo right handed female with PMH of prediabetes and HTN not on medications presents with slurred speech, left hand weakness, loss of balance with falling to the left, and choking on liquids since waking up 2 days ago. Symptoms have gradually improved and now only complains of mild slurred speech, difficulty swallowing, and left finger clumsiness. Also noticed some difficulty with reading where she would skip over some words when reading fast. No gait disturbance Patient denies history of stroke or similar symptoms. Patient is a currently smoker for 20 years, 5-6 cigarettes a day. She has not followed up with a PCP in a long time to check her A1c or BP.       Review of Systems:   CONSTITUTIONAL: No fevers or chills  EYES AND ENT: No visual changes or no throat pain   NECK: No pain or stiffness  RESPIRATORY: No shortness of breath  CARDIOVASCULAR: No chest pain or palpitations  GASTROINTESTINAL: No nausea or vomiting   GENITOURINARY: No dysuria  NEUROLOGICAL: +As stated in HPI above  SKIN: No itching, burning, rashes, or lesions   All other review of systems is negative unless indicated above.    Allergies:  No Known Allergies      PMHx/PSHx/Family Hx: As above, otherwise see below   No pertinent past medical history        Social Hx:  Current smoker; no current use of tobacco, alcohol, or illicit drugs    Medications:  MEDICATIONS  (STANDING):    MEDICATIONS  (PRN):      Vitals:  T(C): 37.3 (10-24-24 @ 11:56), Max: 37.3 (10-24-24 @ 11:56)  HR: 88 (10-24-24 @ 13:10) (88 - 139)  BP: 158/121 (10-24-24 @ 13:10) (158/121 - 194/104)  RR: 20 (10-24-24 @ 13:10) (20 - 22)  SpO2: 99% (10-24-24 @ 13:10) (99% - 100%)    Physical Examination:  General - non-toxic appearing male/female in no acute distress  Cardiovascular - peripheral pulses palpable, no edema  Respiratory - breathing comfortably with no increased work of breathing    Neurologic Exam:  Mental status - Awake, Alert, Oriented to person, place, and time. Speech fluent, repetition and naming intact. Reading intact, but occasionally skips words. Follows simple and complex commands. Attention/concentration intact    Cranial nerves - PERRLA (4mm -> 3mm b/l), VFF, EOMI, face sensation (V1-V3) intact b/l, facial strength intact without asymmetry b/l, hearing intact b/l, palate with symmetric elevation, trapezius 5/5 strength b/l, tongue midline on protrusion with full lateral movement    Motor - Normal bulk and tone throughout. No pronator drift.    Strength testing                              Right           Left  Should-Abduct       5                   5  Elbow-Flex             5                   5  Elbow-Ext              5                   5  Wrist-Flex              5                   5  Wrist-Ext               5                   5  Interossei              5                   5                        5                   5    Hip-Flex                 5                   5  Hip-Ext                  5                   5  Knee-Flex              5                   5  Knee-Ext                5                   5  Dorsiflex                5                   5  Plantarflex             5                   5    Sensation - Light touch intact throughout    Reflexes:                                             Right             Left  Triceps                     2+                2+  Biceps                      2+                2+  Brachioradialis          2+                2+  Patellar                    2+                 2+  Achilles                    2+                 2+  Plantar                   Down            Down    Coordination - Finger to Nose intact b/l. No tremors appreciated    Gait and station - Normal casual gait. Romberg (-)    Labs:                        15.0   8.54  )-----------( 263      ( 24 Oct 2024 12:14 )             46.7     10-24    133[L]  |  95[L]  |  10  ----------------------------<  467[HH]  3.5   |  22  |  0.56    Ca    9.6      24 Oct 2024 12:14    TPro  7.8  /  Alb  4.4  /  TBili  0.5  /  DBili  x   /  AST  9[L]  /  ALT  17  /  AlkPhos  75  10-24    CAPILLARY BLOOD GLUCOSE        LIVER FUNCTIONS - ( 24 Oct 2024 12:14 )  Alb: 4.4 g/dL / Pro: 7.8 g/dL / ALK PHOS: 75 U/L / ALT: 17 U/L / AST: 9 U/L / GGT: x             PT/INR - ( 24 Oct 2024 12:14 )   PT: 11.4 sec;   INR: 1.00 ratio         PTT - ( 24 Oct 2024 12:14 )  PTT:30.6 sec  CSF:                  Radiology:    IMPRESSION:        1.   Right carotid system:  No hemodynamically significant stenosis.        2.   Left carotid system:  No hemodynamically significant stenosis.        3.   Intracranial circulation:  No significant vascular lesion.        4.   Brain:  acute/subacute 1.8 cm infarction within the RIGHT basal   ganglia.  No associated hemorrhage seen.  Age indeterminate 5 mm lacunar   infarction in the LEFT kisha.

## 2024-10-24 NOTE — ED ADULT TRIAGE NOTE - BSA (M2)
[address] [place] [address]  [address] [address]  Phone: [phone]  Fax: [phone]    Emily Armendariz        March 8, 2021     Patient: Merrill Kwok   YOB: 2002   Date of Visit: 3/8/2021       To Whom it May Concern:    Bijan Alvarez was seen in my clinic on 3/8/2021. Please allow her to be able to do virtual learning from home. She is unable to sit in school for any length of time. Patient should be allowed to do virtual learning from 2/22/2021 to 4/12/2021. If you have any questions or concerns, please don't hesitate to call.     Sincerely,         Sofie Pandey PA-C
2.24

## 2024-10-24 NOTE — ED PROVIDER NOTE - BIRTH SEX
Subjective:       Patient ID:  Shahana Mcknight is a 71 y.o. female who presents for   Chief Complaint   Patient presents with    Spot     face     New patient presents today with concern for spot face. Has a spot under nose that noticed about 1 year ago that has grown in size. Spot gets red from time to time.  Assessed by [facility] about 6 months ago.  No treatment.  Also has dry spot to right forehead treated with cryo about 6 months ago.  Feels has decreased in size.     Last skin check by [facility] six months ago. Requests FBSE today.    Hx of BCC x's 2 on face within six months of each other treated with MOHS (Dr. Martinez) and plastics to closed next day Nov 2005- Venkatesh Montero   Hx of BCC right leg treated with surgery by derm in [place] about 4 years ago.        Review of Systems   Constitutional: Negative for weight loss, weight gain and night sweats.   Skin: Positive for daily sunscreen use, activity-related sunscreen use and wears hat.   Hematologic/Lymphatic: Bruises/bleeds easily.        Objective:    Physical Exam   Constitutional: She appears well-developed and well-nourished. No distress.   HENT:   Head:       Mouth/Throat: Lips normal.    Eyes: Lids are normal.  No conjunctival no injection.   Cardiovascular: There is no local extremity swelling and no dependent edema.     Neurological: She is alert and oriented to person, place, and time. She is not disoriented.   Psychiatric: She has a normal mood and affect.   Skin:   Areas Examined (abnormalities noted in diagram):   Scalp / Hair Palpated and Inspected  Head / Face Inspection Performed  Neck Inspection Performed  Chest / Axilla Inspection Performed  Abdomen Inspection Performed  Genitals / Buttocks / Groin Inspection Performed  Back Inspection Performed  RUE Inspected  LUE Inspection Performed  RLE Inspected  LLE Inspection Performed  Nails and Digits Inspection Performed              Diagram Legend     Erythematous scaling  macule/papule c/w actinic keratosis       Vascular papule c/w angioma      Pigmented verrucoid papule/plaque c/w seborrheic keratosis      Yellow umbilicated papule c/w sebaceous hyperplasia      Irregularly shaped tan macule c/w lentigo     1-2 mm smooth white papules consistent with Milia      Movable subcutaneous cyst with punctum c/w epidermal inclusion cyst      Subcutaneous movable cyst c/w pilar cyst      Firm pink to brown papule c/w dermatofibroma      Pedunculated fleshy papule(s) c/w skin tag(s)      Evenly pigmented macule c/w junctional nevus     Mildly variegated pigmented, slightly irregular-bordered macule c/w mildly atypical nevus      Flesh colored to evenly pigmented papule c/w intradermal nevus       Pink pearly papule/plaque c/w basal cell carcinoma      Erythematous hyperkeratotic cursted plaque c/w SCC      Surgical scar with no sign of skin cancer recurrence      Open and closed comedones      Inflammatory papules and pustules      Verrucoid papule consistent consistent with wart     Erythematous eczematous patches and plaques     Dystrophic onycholytic nail with subungual debris c/w onychomycosis     Umbilicated papule    Erythematous-base heme-crusted tan verrucoid plaque consistent with inflamed seborrheic keratosis     Erythematous Silvery Scaling Plaque c/w Psoriasis     See annotation      Assessment / Plan:        Multiple actinic keratoses  Cryosurgery Procedure Note    Verbal consent from the patient is obtained and the patient is aware of the precancerous quality and need for treatment of these lesions. Liquid nitrogen cryosurgery is applied to the 3 actinic keratoses, as detailed in the physical exam, to produce a freeze injury. The patient is aware that blisters may form and is instructed on wound care with gentle cleansing and use of vaseline ointment to keep moist until healed. The patient is supplied a handout on cryosurgery and is instructed to call if lesions do not  completely resolve. Discussed risk postinflammatory pigmentary changes.       Personal history of other malignant neoplasm of skin  Area of previous NMSC examined. Site well healed with no signs of recurrence.    Total body skin examination performed today including at least 12 points as noted in physical examination. No lesions suspicious for malignancy noted.    Sun-damaged skin  Discussed with patient the importance of sun precautions, including broad spectrum sunscreen use with minimum SPF 30, wearing sun protective clothing and wide-brim hat as well as sun avoidance during peak hours between 10 am and 4 pm.       Skin cancer screening  Area of previous NMSC examined. Site well healed with no signs of recurrence.    Total body skin examination performed today including at least 12 points as noted in physical examination. No lesions suspicious for malignancy noted.    Seborrheic keratoses, trunk  These are benign inherited growths without a malignant potential. Reassurance given to patient. No treatment is necessary.                Follow-up in about 6 months (around 2/22/2019).   Female

## 2024-10-24 NOTE — ED PROVIDER NOTE - CARE PLAN
1 Principal Discharge DX:	Slurred speech   Principal Discharge DX:	Ischemic stroke  Secondary Diagnosis:	Hyperglycemia

## 2024-10-24 NOTE — H&P ADULT - ASSESSMENT
48y F pmh prediabetes, HTN p/w slurred speech, left hand weakness, loss of balance, dysphagia x 2d found to have acute right basal ganglia infarct and left lacunar infarct being a/f further tx

## 2024-10-24 NOTE — H&P ADULT - PROBLEM SELECTOR PLAN 1
P/w slurred speech, balance disturbance, dysphagia, left hand clumsiness. LKN 2d ago   - EKG: sinus tachy    - Labs: cbc unremarkable, chem marked hyperglycemia ( iso likely DM), Trop neg, UA: noninfectious, Flu/RSV/COVID: neg   - CXR: clear   - CTH: acute right basal ganglia infarct and age indeterminate left lacunar infarct  - CTA H/Neck:  no significant stenosis or vascular lesion   - NIHSS: 0.   LKN: 2d ago    - Not tPA candidate(outside window), Not mechanical thrombectomy candidate (no large vessel occlusion on CTA)  - ASA 81 mg and Plavix 75mg QD for 21 days then c/w aspirin only  - Atorvastatin 80mg qd, goal LDL < 70)  - Check HgbA1C, lipid panel (ordered)  - BP control, goal <130/80    - MRI brain  (ordered)   - TTE (ordered), Telemetry   - VS & Neuro checks q4h  - Fall & aspiration precautions  - Passed dysphagia screen but still endorsing discomfort w/ swallowing will consult  SLP    - PT/OT consult (ordered)   - Appreciate Neuro rec

## 2024-10-25 ENCOUNTER — RESULT REVIEW (OUTPATIENT)
Age: 49
End: 2024-10-25

## 2024-10-25 DIAGNOSIS — E78.5 HYPERLIPIDEMIA, UNSPECIFIED: ICD-10-CM

## 2024-10-25 DIAGNOSIS — E11.65 TYPE 2 DIABETES MELLITUS WITH HYPERGLYCEMIA: ICD-10-CM

## 2024-10-25 LAB
A1C WITH ESTIMATED AVERAGE GLUCOSE RESULT: 11.6 % — HIGH (ref 4–5.6)
A1C WITH ESTIMATED AVERAGE GLUCOSE RESULT: 11.7 % — HIGH (ref 4–5.6)
ANION GAP SERPL CALC-SCNC: 13 MMOL/L — SIGNIFICANT CHANGE UP (ref 5–17)
BUN SERPL-MCNC: 12 MG/DL — SIGNIFICANT CHANGE UP (ref 7–23)
CALCIUM SERPL-MCNC: 9.1 MG/DL — SIGNIFICANT CHANGE UP (ref 8.4–10.5)
CHLORIDE SERPL-SCNC: 100 MMOL/L — SIGNIFICANT CHANGE UP (ref 96–108)
CHOLEST SERPL-MCNC: 220 MG/DL — HIGH
CO2 SERPL-SCNC: 23 MMOL/L — SIGNIFICANT CHANGE UP (ref 22–31)
CREAT SERPL-MCNC: 0.6 MG/DL — SIGNIFICANT CHANGE UP (ref 0.5–1.3)
CULTURE RESULTS: SIGNIFICANT CHANGE UP
EGFR: 111 ML/MIN/1.73M2 — SIGNIFICANT CHANGE UP
ESTIMATED AVERAGE GLUCOSE: 286 MG/DL — HIGH (ref 68–114)
ESTIMATED AVERAGE GLUCOSE: 289 MG/DL — HIGH (ref 68–114)
GLUCOSE BLDC GLUCOMTR-MCNC: 266 MG/DL — HIGH (ref 70–99)
GLUCOSE BLDC GLUCOMTR-MCNC: 274 MG/DL — HIGH (ref 70–99)
GLUCOSE BLDC GLUCOMTR-MCNC: 275 MG/DL — HIGH (ref 70–99)
GLUCOSE BLDC GLUCOMTR-MCNC: 286 MG/DL — HIGH (ref 70–99)
GLUCOSE SERPL-MCNC: 278 MG/DL — HIGH (ref 70–99)
HCT VFR BLD CALC: 42.1 % — SIGNIFICANT CHANGE UP (ref 34.5–45)
HDLC SERPL-MCNC: 36 MG/DL — LOW
HGB BLD-MCNC: 13.4 G/DL — SIGNIFICANT CHANGE UP (ref 11.5–15.5)
INR BLD: 1.04 RATIO — SIGNIFICANT CHANGE UP (ref 0.85–1.16)
LIPID PNL WITH DIRECT LDL SERPL: 162 MG/DL — HIGH
MCHC RBC-ENTMCNC: 26.1 PG — LOW (ref 27–34)
MCHC RBC-ENTMCNC: 31.8 GM/DL — LOW (ref 32–36)
MCV RBC AUTO: 81.9 FL — SIGNIFICANT CHANGE UP (ref 80–100)
NON HDL CHOLESTEROL: 184 MG/DL — HIGH
NRBC # BLD: 0 /100 WBCS — SIGNIFICANT CHANGE UP (ref 0–0)
PLATELET # BLD AUTO: 227 K/UL — SIGNIFICANT CHANGE UP (ref 150–400)
POTASSIUM SERPL-MCNC: 3.4 MMOL/L — LOW (ref 3.5–5.3)
POTASSIUM SERPL-SCNC: 3.4 MMOL/L — LOW (ref 3.5–5.3)
PROTHROM AB SERPL-ACNC: 12 SEC — SIGNIFICANT CHANGE UP (ref 9.9–13.4)
RBC # BLD: 5.14 M/UL — SIGNIFICANT CHANGE UP (ref 3.8–5.2)
RBC # FLD: 14.9 % — HIGH (ref 10.3–14.5)
SODIUM SERPL-SCNC: 136 MMOL/L — SIGNIFICANT CHANGE UP (ref 135–145)
SPECIMEN SOURCE: SIGNIFICANT CHANGE UP
TRIGL SERPL-MCNC: 125 MG/DL — SIGNIFICANT CHANGE UP
WBC # BLD: 8.23 K/UL — SIGNIFICANT CHANGE UP (ref 3.8–10.5)
WBC # FLD AUTO: 8.23 K/UL — SIGNIFICANT CHANGE UP (ref 3.8–10.5)

## 2024-10-25 PROCEDURE — 93970 EXTREMITY STUDY: CPT | Mod: 26

## 2024-10-25 PROCEDURE — 70551 MRI BRAIN STEM W/O DYE: CPT | Mod: 26

## 2024-10-25 PROCEDURE — 93306 TTE W/DOPPLER COMPLETE: CPT | Mod: 26

## 2024-10-25 PROCEDURE — 99223 1ST HOSP IP/OBS HIGH 75: CPT

## 2024-10-25 RX ORDER — INSULIN LISPRO 100/ML
6 VIAL (ML) SUBCUTANEOUS
Refills: 0 | Status: DISCONTINUED | OUTPATIENT
Start: 2024-10-25 | End: 2024-10-26

## 2024-10-25 RX ORDER — LABETALOL HCL 200 MG
100 TABLET ORAL THREE TIMES A DAY
Refills: 0 | Status: DISCONTINUED | OUTPATIENT
Start: 2024-10-25 | End: 2024-10-29

## 2024-10-25 RX ORDER — POTASSIUM CHLORIDE 10 MEQ
20 TABLET, EXTENDED RELEASE ORAL ONCE
Refills: 0 | Status: COMPLETED | OUTPATIENT
Start: 2024-10-25 | End: 2024-10-25

## 2024-10-25 RX ORDER — INFLUENZ VIR VAC TV P-SURF2003 15MCG/.5ML
0.5 SYRINGE (ML) INTRAMUSCULAR ONCE
Refills: 0 | Status: DISCONTINUED | OUTPATIENT
Start: 2024-10-25 | End: 2024-10-29

## 2024-10-25 RX ORDER — LISINOPRIL 40 MG
10 TABLET ORAL DAILY
Refills: 0 | Status: DISCONTINUED | OUTPATIENT
Start: 2024-10-25 | End: 2024-10-29

## 2024-10-25 RX ORDER — LISINOPRIL 40 MG
5 TABLET ORAL ONCE
Refills: 0 | Status: COMPLETED | OUTPATIENT
Start: 2024-10-25 | End: 2024-10-25

## 2024-10-25 RX ORDER — INSULIN GLARGINE,HUM.REC.ANLOG 100/ML
20 VIAL (ML) SUBCUTANEOUS AT BEDTIME
Refills: 0 | Status: DISCONTINUED | OUTPATIENT
Start: 2024-10-25 | End: 2024-10-26

## 2024-10-25 RX ADMIN — NICOTINE POLACRILEX 1 PATCH: 4 GUM, CHEWING ORAL at 19:40

## 2024-10-25 RX ADMIN — CLOPIDOGREL 75 MILLIGRAM(S): 75 TABLET ORAL at 11:08

## 2024-10-25 RX ADMIN — NICOTINE POLACRILEX 1 PATCH: 4 GUM, CHEWING ORAL at 11:09

## 2024-10-25 RX ADMIN — Medication 5 MILLIGRAM(S): at 17:47

## 2024-10-25 RX ADMIN — Medication 5 MILLIGRAM(S): at 05:20

## 2024-10-25 RX ADMIN — Medication 80 MILLIGRAM(S): at 22:36

## 2024-10-25 RX ADMIN — Medication 20 MILLIEQUIVALENT(S): at 14:32

## 2024-10-25 RX ADMIN — Medication 81 MILLIGRAM(S): at 11:08

## 2024-10-25 RX ADMIN — Medication 3: at 13:02

## 2024-10-25 RX ADMIN — Medication 3: at 08:25

## 2024-10-25 RX ADMIN — Medication 3: at 17:11

## 2024-10-25 RX ADMIN — Medication 100 MILLIGRAM(S): at 17:48

## 2024-10-25 RX ADMIN — Medication 20 UNIT(S): at 22:35

## 2024-10-25 RX ADMIN — Medication 1: at 22:35

## 2024-10-25 RX ADMIN — Medication 6 UNIT(S): at 17:12

## 2024-10-25 RX ADMIN — Medication 100 MILLIGRAM(S): at 22:36

## 2024-10-25 RX ADMIN — Medication 40 MILLIGRAM(S): at 05:21

## 2024-10-25 NOTE — OCCUPATIONAL THERAPY INITIAL EVALUATION ADULT - ADDITIONAL COMMENTS
Pt lives with  in private home (apt within a private home) 10 steps to enter no HR, then ~13 steps +HR to 2nd level of home. As per patient, independent with ADLs prior to admission, ambulated independently with no assistive devices.

## 2024-10-25 NOTE — SWALLOW BEDSIDE ASSESSMENT ADULT - COMMENTS
ROS: Denies HA, CP, SOB, palpitation, N/V/D, fever, cough, chills, dizziness, numbness, tingling, weakness  A 10-system ROS was performed and is negative except as noted above and/or in the HPI.    ED: Eval'd by neuro. Labs and imaging done. EKG NSR, CTH -> right basal ganglia infarct, age indeterminant left lacunar infarct. Given  IVF, Plavix, Insulin for hyperglycemia. Tele

## 2024-10-25 NOTE — PHYSICAL THERAPY INITIAL EVALUATION ADULT - GENERAL OBSERVATIONS, REHAB EVAL
Pt rec'd semisupine in bed, +IVL, in NAD, spouse at bedside.  Pt cleared for PT session by DALLAS Trivedi.

## 2024-10-25 NOTE — OCCUPATIONAL THERAPY INITIAL EVALUATION ADULT - GENERAL OBSERVATIONS, REHAB EVAL
Upon entry, patient semi-supine in bed,  present at bedside, patient agreeable to OT eval, cleared for OT evaluation as per DALLAS Trivedi

## 2024-10-25 NOTE — PROVIDER CONTACT NOTE (OTHER) - ASSESSMENT
Pt AAOx4. Pt hypertensive upon admission to [address] [place] [address]. Pt /107. Manual recheck 158/80.

## 2024-10-25 NOTE — SWALLOW BEDSIDE ASSESSMENT ADULT - PHARYNGEAL PHASE
inconsistent, variable dry cough before and after PO intake; difficult to discern if swallow related

## 2024-10-25 NOTE — SWALLOW BEDSIDE ASSESSMENT ADULT - SWALLOW EVAL: RECOMMENDED DIET
suggest continuing current diet of regular solids and thin liquids pending MBS; cough is not consistent and may not be swallow related - pt is stable and ambulatory with good respiratory status

## 2024-10-25 NOTE — SWALLOW BEDSIDE ASSESSMENT ADULT - SWALLOW EVAL: DIAGNOSIS
Attempted to see pt for bedside swallow evaluation. Pt currently off the floor for echo. Will re-attempt as schedule permits. Patient presents with inconsistent coughing during and outside of PO intake which could be indicative of pharyngeal dysphagia s/p acute CVA. The swallow is characterized by adequate mastication of solids, timely oral transit, complete oral clearance post swallow, and inconsistent variable dry cough before and after PO intake.

## 2024-10-25 NOTE — CHART NOTE - NSCHARTNOTEFT_GEN_A_CORE
Brief neurology update    49 yo right handed female with PMH of prediabetes and HTN not on medications presents with slurred speech, left hand weakness, loss of balance with falling to the left, and choking on liquids since waking up 2 days ago. Symptoms have gradually improved and now only complains of mild slurred speech, difficulty swallowing, and left finger clumsiness. Also noticed some difficulty with reading where she would skip over some words when reading fast. No gait disturbance Patient denies history of stroke or similar symptoms. Patient is a currently smoker for 20 years, 5-6 cigarettes a day. She has not followed up with a PCP in a long time to check her A1c or BP. CTH shows subacute/acute R basal ganglia stroke and L pontine stroke. CTA shows no LVO or significant stenosis.      Recommendations in addition to prior note    [] CT chest/abdomen/Pelvis (stroke in the young work-up)  [] Hypercoagulable panel  [] Lower extremity dopplers  [] Urine toxicology      Neurology will follow for results of testing requested    Case d/w and seen with neurology attending Dr Rowe

## 2024-10-25 NOTE — CONSULT NOTE ADULT - SUBJECTIVE AND OBJECTIVE BOX
HPI:  48y F pmh prediabetes and HTN not on medications p/w slurred speech, left hand weakness, loss of balance with falling to the left, and choking on liquids since waking up 2 days ago. Symptoms have gradually improved and now only complains of mild slurred speech, difficulty swallowing, and left finger clumsiness. Also noticed some difficulty with reading, skips over some words when reading fast. No gait disturbance. Has not seen a PCP or gone for medical check up in a long time. Patient denies history of stroke or similar symptoms. Patient is a current smoker for 20 years, 5-6 cigarettes a day.   Endocrine consulted for new onset diabetes with A1c of 11.6% Pt. last seen by PCP around 5 years ago with prediabetes. Denies any recent changes to diet, activity, lifestyle, infections, or steroids. Denies polyuria, polydipsia, blurry vision, vomiting, weight loss, paresthesias. Had some nasuea. Mother and Father with hx of Type 2 DM. Does not check glucose. No reported hypoglycemia or symptoms of hypoglycemia. No soda or juice. Tries to limit carbs.  has Type 2 DM.     PAST MEDICAL & SURGICAL HISTORY:  HTN (hypertension)  Prediabetes          FAMILY HISTORY: Mother and Father- Type 2 DM      Social History: + tobacco use. + social alcohol use    Outpatient Medications: None    MEDICATIONS  (STANDING):  aspirin enteric coated 81 milliGRAM(s) Oral daily  atorvastatin 80 milliGRAM(s) Oral at bedtime  clopidogrel Tablet 75 milliGRAM(s) Oral daily  dextrose 5%. 1000 milliLiter(s) (100 mL/Hr) IV Continuous <Continuous>  dextrose 5%. 1000 milliLiter(s) (50 mL/Hr) IV Continuous <Continuous>  dextrose 50% Injectable 25 Gram(s) IV Push once  dextrose 50% Injectable 12.5 Gram(s) IV Push once  dextrose 50% Injectable 25 Gram(s) IV Push once  enoxaparin Injectable 40 milliGRAM(s) SubCutaneous every 24 hours  glucagon  Injectable 1 milliGRAM(s) IntraMuscular once  hydrochlorothiazide 12.5 milliGRAM(s) Oral daily  influenza   Vaccine 0.5 milliLiter(s) IntraMuscular once  insulin glargine Injectable (LANTUS) 20 Unit(s) SubCutaneous at bedtime  insulin lispro (ADMELOG) corrective regimen sliding scale   SubCutaneous three times a day before meals  insulin lispro (ADMELOG) corrective regimen sliding scale   SubCutaneous at bedtime  insulin lispro Injectable (ADMELOG) 6 Unit(s) SubCutaneous three times a day before meals  lisinopril 5 milliGRAM(s) Oral daily  nicotine -  14 mG/24Hr(s) Patch 1 Patch Transdermal daily    MEDICATIONS  (PRN):  acetaminophen     Tablet .. 650 milliGRAM(s) Oral every 6 hours PRN Temp greater or equal to 38C (100.4F), Mild Pain (1 - 3)  aluminum hydroxide/magnesium hydroxide/simethicone Suspension 30 milliLiter(s) Oral every 4 hours PRN Dyspepsia  dextrose Oral Gel 15 Gram(s) Oral once PRN Blood Glucose LESS THAN 70 milliGRAM(s)/deciliter  melatonin 3 milliGRAM(s) Oral at bedtime PRN Insomnia  ondansetron Injectable 4 milliGRAM(s) IV Push every 8 hours PRN Nausea and/or Vomiting      Allergies    No Known Allergies    Intolerances      Review of Systems:  Constitutional: No fever, No change in weight  Eyes: No blurry vision  Neuro: No headache, No paresthesias  HEENT: No throat pain  Cardiovascular: No chest pain  Respiratory: No SOB  GI: +nausea without vomiting  : No polyuria  Skin: no rash  Psych: no depression  Endocrine: No polydipsia, No heat or cold intolerance, rest as noted in HPI  Hem/lymph: no swelling    All other review of systems negative      PHYSICAL EXAM:  VITALS: T(C): 37.1 (10-25-24 @ 11:43)  T(F): 98.7 (10-25-24 @ 11:43), Max: 98.7 (10-24-24 @ 22:50)  HR: 90 (10-25-24 @ 12:21) (84 - 107)  BP: 169/103 (10-25-24 @ 12:21) (139/79 - 220/128)  RR:  (18 - 20)  SpO2:  (97% - 99%)  Wt(kg): --  GENERAL: NAD at this time, obese  EYES: No proptosis, EOMI  HEENT:  Atraumatic, Normocephalic,   THYROID: Normal size, no palpable nodules  RESPIRATORY: Clear to auscultation bilaterally, full excursion, non-labored  CARDIOVASCULAR: Regular rhythm; No murmurs; no peripheral edema  GI: Soft, nontender, non distended, normal bowel sounds  SKIN: Dry, intact, No rashes or lesions  MUSCULOSKELETAL: normal strength  NEURO: follows commands  PSYCH: Alert and oriented x 3, normal affect, normal mood  CUSHING'S SIGNS: no striae      POCT Blood Glucose.: 286 mg/dL (10-25-24 @ 12:25)  POCT Blood Glucose.: 266 mg/dL (10-25-24 @ 08:12)  POCT Blood Glucose.: 287 mg/dL (10-24-24 @ 22:49)  POCT Blood Glucose.: 203 mg/dL (10-24-24 @ 18:34)                              13.4   8.23  )-----------( 227      ( 25 Oct 2024 07:13 )             42.1       10-25    136  |  100  |  12  ----------------------------<  278[H]  3.4[L]   |  23  |  0.60    eGFR: 111    Ca    9.1      10-25    TPro  7.8  /  Alb  4.4  /  TBili  0.5  /  DBili  x   /  AST  9[L]  /  ALT  17  /  AlkPhos  75  10-24    Thyroid Function Tests:          10-25 Chol 220[H] Direct LDL -- LDL calculated 162[H] HDL 36[L] Trig 125  Radiology:

## 2024-10-25 NOTE — SWALLOW BEDSIDE ASSESSMENT ADULT - SLP PERTINENT HISTORY OF CURRENT PROBLEM
48y F pmh prediabetes and HTN not on medications p/w slurred speech, left hand weakness, loss of balance with falling to the left, and choking on liquids since waking up 2 days ago. Symptoms have gradually improved and now only complains of mild slurred speech, difficulty swallowing, and left finger clumsiness. Also noticed some difficulty with reading, skips over some words when reading fast. No gait disturbance. Has not seen a PCP or gone for medical check up in a long time. Patient denies history of stroke or similar symptoms. Patient is a current smoker for 20 years, 5-6 cigarettes a day.

## 2024-10-25 NOTE — OCCUPATIONAL THERAPY INITIAL EVALUATION ADULT - PERTINENT HX OF CURRENT PROBLEM, REHAB EVAL
48y F pmh prediabetes and HTN not on medications p/w slurred speech, left hand weakness, loss of balance with falling to the left, and choking on liquids since waking up 2 days ago. Symptoms have gradually improved and now only complains of mild slurred speech, difficulty swallowing, and left finger clumsiness. Also noticed some difficulty with reading, skips over some words when reading fast. No gait disturbance. Has not seen a PCP or gone for medical check up in a long time. Patient denies history of stroke or similar symptoms. Patient is a current smoker for 20 years, 5-6 cigarettes a day.   Chest XRay (10/24): Clear lungs  CT of Head (10/24): Right carotid system:  No hemodynamically significant stenosis. Left carotid system:  No hemodynamically significant stenosis. Intracranial circulation:  No significant vascular lesion. Brain:  acute/subacute 1.8 cm infarction within the RIGHT basal ganglia. No associated hemorrhage seen.  Age indeterminate 5 mm lacunar infarction in the LEFT kisha.  MRI of Head (10/25): Right basal ganglia infarct, 24 hours to 7 days in age. Chronic infarct in the right corona radiata and mild small vessel disease

## 2024-10-25 NOTE — SWALLOW BEDSIDE ASSESSMENT ADULT - SWALLOW EVAL: PATIENT/FAMILY GOALS STATEMENT
Pt endorsed new onset cough with liquid intake a few days ago. Stated it has mildly improved, but not resolved. Feels that liquids are sometimes "going down the wrong way."

## 2024-10-25 NOTE — SWALLOW BEDSIDE ASSESSMENT ADULT - ASR SWALLOW ASPIRATION MONITOR
Monitor for s/s aspiration/laryngeal penetration. If noted:  D/C p.o. intake, provide non-oral nutrition/hydration/meds, and contact this service @ [phone]/change of breathing pattern/gurgly voice/fever

## 2024-10-25 NOTE — PROGRESS NOTE ADULT - SUBJECTIVE AND OBJECTIVE BOX
Patient is a 48y old  Female who presents with a chief complaint of Slurred speech, imbalance (25 Oct 2024 14:53)      SUBJECTIVE / OVERNIGHT EVENTS:     MEDICATIONS  (STANDING):  aspirin enteric coated 81 milliGRAM(s) Oral daily  atorvastatin 80 milliGRAM(s) Oral at bedtime  clopidogrel Tablet 75 milliGRAM(s) Oral daily  dextrose 5%. 1000 milliLiter(s) (100 mL/Hr) IV Continuous <Continuous>  dextrose 5%. 1000 milliLiter(s) (50 mL/Hr) IV Continuous <Continuous>  dextrose 50% Injectable 25 Gram(s) IV Push once  dextrose 50% Injectable 12.5 Gram(s) IV Push once  dextrose 50% Injectable 25 Gram(s) IV Push once  enoxaparin Injectable 40 milliGRAM(s) SubCutaneous every 24 hours  glucagon  Injectable 1 milliGRAM(s) IntraMuscular once  influenza   Vaccine 0.5 milliLiter(s) IntraMuscular once  insulin glargine Injectable (LANTUS) 20 Unit(s) SubCutaneous at bedtime  insulin lispro (ADMELOG) corrective regimen sliding scale   SubCutaneous three times a day before meals  insulin lispro (ADMELOG) corrective regimen sliding scale   SubCutaneous at bedtime  insulin lispro Injectable (ADMELOG) 6 Unit(s) SubCutaneous three times a day before meals  labetalol 100 milliGRAM(s) Oral three times a day  lisinopril 10 milliGRAM(s) Oral daily  nicotine -  14 mG/24Hr(s) Patch 1 Patch Transdermal daily    MEDICATIONS  (PRN):  acetaminophen     Tablet .. 650 milliGRAM(s) Oral every 6 hours PRN Temp greater or equal to 38C (100.4F), Mild Pain (1 - 3)  aluminum hydroxide/magnesium hydroxide/simethicone Suspension 30 milliLiter(s) Oral every 4 hours PRN Dyspepsia  dextrose Oral Gel 15 Gram(s) Oral once PRN Blood Glucose LESS THAN 70 milliGRAM(s)/deciliter  melatonin 3 milliGRAM(s) Oral at bedtime PRN Insomnia  ondansetron Injectable 4 milliGRAM(s) IV Push every 8 hours PRN Nausea and/or Vomiting      CAPILLARY BLOOD GLUCOSE      POCT Blood Glucose.: 274 mg/dL (25 Oct 2024 22:24)  POCT Blood Glucose.: 275 mg/dL (25 Oct 2024 17:00)  POCT Blood Glucose.: 286 mg/dL (25 Oct 2024 12:25)  POCT Blood Glucose.: 266 mg/dL (25 Oct 2024 08:12)  POCT Blood Glucose.: 287 mg/dL (24 Oct 2024 22:49)    I&O's Summary    24 Oct 2024 07:01  -  25 Oct 2024 07:00  --------------------------------------------------------  IN: 240 mL / OUT: 0 mL / NET: 240 mL      T(C): 36.8 (10-25-24 @ 21:27), Max: 37.1 (10-25-24 @ 11:43)  HR: 87 (10-25-24 @ 21:27) (87 - 99)  BP: 165/107 (10-25-24 @ 21:27) (158/89 - 169/103)  RR: 18 (10-25-24 @ 21:27) (18 - 18)  SpO2: 98% (10-25-24 @ 21:27) (97% - 98%)    PHYSICAL EXAM:  GENERAL: NAD, well-developed  HEAD:  Atraumatic, Normocephalic  EYES: EOMI, PERRLA, conjunctiva and sclera clear  NECK: Supple, No JVD  CHEST/LUNG: Clear to auscultation bilaterally; No wheeze  HEART: Regular rate and rhythm; No murmurs, rubs, or gallops  ABDOMEN: Soft, Nontender, Nondistended; Bowel sounds present  EXTREMITIES:  2+ Peripheral Pulses, No clubbing, cyanosis, or edema  PSYCH: AAOx3  NEUROLOGY: non-focal  SKIN: No rashes or lesions    LABS:                        13.4   8.23  )-----------( 227      ( 25 Oct 2024 07:13 )             42.1     10-25    136  |  100  |  12  ----------------------------<  278[H]  3.4[L]   |  23  |  0.60    Ca    9.1      25 Oct 2024 07:14    TPro  7.8  /  Alb  4.4  /  TBili  0.5  /  DBili  x   /  AST  9[L]  /  ALT  17  /  AlkPhos  75  10-24    PT/INR - ( 25 Oct 2024 07:13 )   PT: 12.0 sec;   INR: 1.04 ratio         PTT - ( 24 Oct 2024 12:14 )  PTT:30.6 sec      Urinalysis Basic - ( 25 Oct 2024 07:14 )    Color: x / Appearance: x / SG: x / pH: x  Gluc: 278 mg/dL / Ketone: x  / Bili: x / Urobili: x   Blood: x / Protein: x / Nitrite: x   Leuk Esterase: x / RBC: x / WBC x   Sq Epi: x / Non Sq Epi: x / Bacteria: x        RADIOLOGY & ADDITIONAL TESTS:    Imaging Personally Reviewed:    Consultant(s) Notes Reviewed:      Care Discussed with Consultants/Other Providers:

## 2024-10-25 NOTE — PATIENT PROFILE ADULT - FALL HARM RISK - HARM RISK INTERVENTIONS

## 2024-10-25 NOTE — CONSULT NOTE ADULT - ASSESSMENT
47 y/o F w/ newly diagnosed Type 2 DM now complicated by CVA, HTN, HLD admitted with slurred speech (high risk patient with severely uncontrolled diabetes with A1c of 11.6% at high risk of CAD and CVA now with CVA with high medical complexity and high level decision-making).

## 2024-10-25 NOTE — CONSULT NOTE ADULT - PROBLEM SELECTOR RECOMMENDATION 4
Start Ozempic for additional weight loss benefits  Dietary and lifestyle modifications discussed at length.      Discussed with primary team.        Scott Brown D.O  [phone]

## 2024-10-25 NOTE — PHYSICAL THERAPY INITIAL EVALUATION ADULT - PERTINENT HX OF CURRENT PROBLEM, REHAB EVAL
Pt is a 48 year old female with PMH significant for prediabetes and HTN not on medications, current smoker (5-6 cigarettes per day).  Pt presents with slurred speech, left hand weakness, loss of balance with falling to the left, and choking on liquids since waking up 2 days ago. Symptoms have gradually improved and now only complains of mild slurred speech, difficulty swallowing, and left finger clumsiness. Also noticed some difficulty with reading, skips over some words when reading fast. No gait disturbance. Has not seen a PCP or gone for medical check up in a long time. ED: Eval'd by neuro. Labs and imaging done. EKG NSR, CTH -> right basal ganglia infarct, age indeterminant left lacunar infarct. Given  IVF, Plavix, Insulin for hyperglycemia. Tele.  Pt admitted for further treatment.   CXR(10/24): Clear lungs  CTH(10/24): Right carotid system:  No hemodynamically significant stenosis. Left carotid system:  No hemodynamically significant stenosis. Intracranial circulation:  No significant vascular lesion. Brain:  acute/subacute 1.8 cm infarction within the RIGHT basal   ganglia. No associated hemorrhage seen.  Age indeterminate 5 mm lacunar infarction in the LEFT kisha.  MR Head(10/25): Right basal ganglia infarct, 24 hours to 7 days in age. Chronic infarct in the right corona radiata and mild small vessel disease.

## 2024-10-25 NOTE — PHYSICAL THERAPY INITIAL EVALUATION ADULT - ADDITIONAL COMMENTS
Pt lives in an apartment with spouse with 4-5 HUDSON with BL rails and 8 HUDSON without rails.  PTA pt independent with ambulation and ADLs.  Pt works from home. Pt lives in an apartment with spouse with 10 HUDSON with no rails and 13 HUDSON with rails.  PTA pt independent with ambulation and ADLs.  Pt works from home.

## 2024-10-25 NOTE — SWALLOW BEDSIDE ASSESSMENT ADULT - SLP GENERAL OBSERVATIONS
Patient encountered awake and alert, sitting upright at edge of bed, on RA, A&Ox4, vocal quality WNL, able to follow commands and make wants/needs known.

## 2024-10-25 NOTE — CHART NOTE - NSCHARTNOTEFT_GEN_A_CORE
Notified by RN of pt's /109. Spoke with Neuro, Dr. Sadia Phillips recs to keep BP normotensive. Spoke with Dr. Wandy ruizs to dcd HCTZ, increase Lisinopril to 10mg and add Labetolol 100mg TID. Meds adjusted as recommended. RN made aware, will continue to monitor.      [place] Alfonso NP  [id number]

## 2024-10-26 LAB
ALBUMIN SERPL ELPH-MCNC: 3.5 G/DL — SIGNIFICANT CHANGE UP (ref 3.3–5)
ALP SERPL-CCNC: 50 U/L — SIGNIFICANT CHANGE UP (ref 40–120)
ALT FLD-CCNC: 14 U/L — SIGNIFICANT CHANGE UP (ref 10–45)
ANION GAP SERPL CALC-SCNC: 11 MMOL/L — SIGNIFICANT CHANGE UP (ref 5–17)
AST SERPL-CCNC: 10 U/L — SIGNIFICANT CHANGE UP (ref 10–40)
BILIRUB SERPL-MCNC: 0.6 MG/DL — SIGNIFICANT CHANGE UP (ref 0.2–1.2)
BUN SERPL-MCNC: 18 MG/DL — SIGNIFICANT CHANGE UP (ref 7–23)
CALCIUM SERPL-MCNC: 9.1 MG/DL — SIGNIFICANT CHANGE UP (ref 8.4–10.5)
CHLORIDE SERPL-SCNC: 99 MMOL/L — SIGNIFICANT CHANGE UP (ref 96–108)
CO2 SERPL-SCNC: 25 MMOL/L — SIGNIFICANT CHANGE UP (ref 22–31)
CREAT SERPL-MCNC: 0.74 MG/DL — SIGNIFICANT CHANGE UP (ref 0.5–1.3)
EGFR: 100 ML/MIN/1.73M2 — SIGNIFICANT CHANGE UP
GLUCOSE BLDC GLUCOMTR-MCNC: 207 MG/DL — HIGH (ref 70–99)
GLUCOSE BLDC GLUCOMTR-MCNC: 238 MG/DL — HIGH (ref 70–99)
GLUCOSE BLDC GLUCOMTR-MCNC: 244 MG/DL — HIGH (ref 70–99)
GLUCOSE BLDC GLUCOMTR-MCNC: 288 MG/DL — HIGH (ref 70–99)
GLUCOSE SERPL-MCNC: 255 MG/DL — HIGH (ref 70–99)
POTASSIUM SERPL-MCNC: 3.6 MMOL/L — SIGNIFICANT CHANGE UP (ref 3.5–5.3)
POTASSIUM SERPL-SCNC: 3.6 MMOL/L — SIGNIFICANT CHANGE UP (ref 3.5–5.3)
PROT SERPL-MCNC: 6.2 G/DL — SIGNIFICANT CHANGE UP (ref 6–8.3)
SODIUM SERPL-SCNC: 135 MMOL/L — SIGNIFICANT CHANGE UP (ref 135–145)

## 2024-10-26 PROCEDURE — 71260 CT THORAX DX C+: CPT | Mod: 26

## 2024-10-26 PROCEDURE — 74230 X-RAY XM SWLNG FUNCJ C+: CPT | Mod: 26

## 2024-10-26 PROCEDURE — 74177 CT ABD & PELVIS W/CONTRAST: CPT | Mod: 26

## 2024-10-26 PROCEDURE — 99233 SBSQ HOSP IP/OBS HIGH 50: CPT

## 2024-10-26 RX ORDER — INSULIN LISPRO 100/ML
VIAL (ML) SUBCUTANEOUS
Refills: 0 | Status: DISCONTINUED | OUTPATIENT
Start: 2024-10-26 | End: 2024-10-29

## 2024-10-26 RX ORDER — POLYETHYLENE GLYCOL 3350 17 G/17G
17 POWDER, FOR SOLUTION ORAL DAILY
Refills: 0 | Status: DISCONTINUED | OUTPATIENT
Start: 2024-10-26 | End: 2024-10-29

## 2024-10-26 RX ORDER — INSULIN GLARGINE,HUM.REC.ANLOG 100/ML
23 VIAL (ML) SUBCUTANEOUS AT BEDTIME
Refills: 0 | Status: DISCONTINUED | OUTPATIENT
Start: 2024-10-26 | End: 2024-10-27

## 2024-10-26 RX ORDER — SENNA 187 MG
2 TABLET ORAL AT BEDTIME
Refills: 0 | Status: DISCONTINUED | OUTPATIENT
Start: 2024-10-26 | End: 2024-10-29

## 2024-10-26 RX ORDER — INSULIN LISPRO 100/ML
8 VIAL (ML) SUBCUTANEOUS
Refills: 0 | Status: DISCONTINUED | OUTPATIENT
Start: 2024-10-26 | End: 2024-10-27

## 2024-10-26 RX ADMIN — Medication 2: at 08:34

## 2024-10-26 RX ADMIN — Medication 2 TABLET(S): at 21:45

## 2024-10-26 RX ADMIN — Medication 81 MILLIGRAM(S): at 11:21

## 2024-10-26 RX ADMIN — Medication 40 MILLIGRAM(S): at 06:22

## 2024-10-26 RX ADMIN — Medication 6 UNIT(S): at 08:34

## 2024-10-26 RX ADMIN — NICOTINE POLACRILEX 1 PATCH: 4 GUM, CHEWING ORAL at 19:00

## 2024-10-26 RX ADMIN — Medication 8 UNIT(S): at 12:14

## 2024-10-26 RX ADMIN — Medication 100 MILLIGRAM(S): at 21:45

## 2024-10-26 RX ADMIN — Medication 8 UNIT(S): at 17:16

## 2024-10-26 RX ADMIN — Medication 23 UNIT(S): at 22:12

## 2024-10-26 RX ADMIN — NICOTINE POLACRILEX 1 PATCH: 4 GUM, CHEWING ORAL at 11:20

## 2024-10-26 RX ADMIN — Medication 100 MILLIGRAM(S): at 13:08

## 2024-10-26 RX ADMIN — Medication 10 MILLIGRAM(S): at 06:21

## 2024-10-26 RX ADMIN — NICOTINE POLACRILEX 1 PATCH: 4 GUM, CHEWING ORAL at 07:30

## 2024-10-26 RX ADMIN — Medication 3: at 17:16

## 2024-10-26 RX ADMIN — Medication 100 MILLIGRAM(S): at 06:22

## 2024-10-26 RX ADMIN — CLOPIDOGREL 75 MILLIGRAM(S): 75 TABLET ORAL at 11:21

## 2024-10-26 RX ADMIN — Medication 80 MILLIGRAM(S): at 21:45

## 2024-10-26 RX ADMIN — Medication 2: at 12:13

## 2024-10-26 NOTE — SWALLOW VFSS/MBS ASSESSMENT ADULT - SLP GENERAL OBSERVATIONS
Pt encountered in radiology, secure in MANN chair. Pt awake and alert, cooperative, following directions for the purposes of the exam.

## 2024-10-26 NOTE — PROGRESS NOTE ADULT - SUBJECTIVE AND OBJECTIVE BOX
Patient seen today for follow up inpatient Diabetes Mellitus management.    Chief Complaint: Type 2 Diabetes Mellitus    INTERVAL HX:  Patient seen in [facility] 4MON 417 D1. Patient is alert and oriented, sitting up in bed. Patient is doing well, eating full meals and tolerating POs. Passed swallow bedside test and passed FEES study done today. FBG elevated this am to 238mg/dL. Patient denies eating any snacks overnight or between meals. No hypoglycemia. Noted post-prandial BGs >200. Patient will require insulin pen injection and BG check education prior to discharge- she notes her  has diabetes and she gives him injections. Blood glucose levels in the last 24hrs have been 238-275mg/dL.     Review of Systems:  General: As above.  Respiratory: Denies any SOB, DAVIS, or cough.  Gastrointestinal: Denies any n/v/d or abdominal pain.   Endocrine: Denies any polyuria, polydipsia, polyphagia, visual changes, or numbness in feet.     Allergies  No Known Allergies      Intolerances  None.       MEDICATIONS  (STANDING):  acetaminophen     Tablet .. 650 milliGRAM(s) Oral every 6 hours PRN  aluminum hydroxide/magnesium hydroxide/simethicone Suspension 30 milliLiter(s) Oral every 4 hours PRN  aspirin enteric coated 81 milliGRAM(s) Oral daily  atorvastatin 80 milliGRAM(s) Oral at bedtime  clopidogrel Tablet 75 milliGRAM(s) Oral daily  dextrose 5%. 1000 milliLiter(s) IV Continuous <Continuous>  dextrose 5%. 1000 milliLiter(s) IV Continuous <Continuous>  dextrose 50% Injectable 25 Gram(s) IV Push once  dextrose 50% Injectable 12.5 Gram(s) IV Push once  dextrose 50% Injectable 25 Gram(s) IV Push once  dextrose Oral Gel 15 Gram(s) Oral once PRN  enoxaparin Injectable 40 milliGRAM(s) SubCutaneous every 24 hours  glucagon  Injectable 1 milliGRAM(s) IntraMuscular once  influenza   Vaccine 0.5 milliLiter(s) IntraMuscular once  insulin glargine Injectable (LANTUS) 23 Unit(s) SubCutaneous at bedtime  insulin lispro (ADMELOG) corrective regimen sliding scale   SubCutaneous three times a day before meals  insulin lispro (ADMELOG) corrective regimen sliding scale   SubCutaneous at bedtime  insulin lispro Injectable (ADMELOG) 8 Unit(s) SubCutaneous three times a day before meals  labetalol 100 milliGRAM(s) Oral three times a day  lisinopril 10 milliGRAM(s) Oral daily  melatonin 3 milliGRAM(s) Oral at bedtime PRN  nicotine -  14 mG/24Hr(s) Patch 1 Patch Transdermal daily  ondansetron Injectable 4 milliGRAM(s) IV Push every 8 hours PRN      atorvastatin 80 milliGRAM(s) Oral at bedtime  dextrose 50% Injectable 25 Gram(s) IV Push once  dextrose 50% Injectable 25 Gram(s) IV Push once  dextrose 50% Injectable 12.5 Gram(s) IV Push once  dextrose Oral Gel 15 Gram(s) Oral once PRN  glucagon  Injectable 1 milliGRAM(s) IntraMuscular once  insulin glargine Injectable (LANTUS) 23 Unit(s) SubCutaneous at bedtime  insulin lispro (ADMELOG) corrective regimen sliding scale   SubCutaneous three times a day before meals  insulin lispro (ADMELOG) corrective regimen sliding scale   SubCutaneous at bedtime  insulin lispro Injectable (ADMELOG) 8 Unit(s) SubCutaneous three times a day before meals      insulin lispro (ADMELOG) corrective regimen sliding scale   SubCutaneous at bedtime  insulin lispro (ADMELOG) corrective regimen sliding scale   SubCutaneous three times a day before meals  insulin lispro Injectable (ADMELOG) 8 Unit(s) SubCutaneous three times a day before meals      PHYSICAL EXAM:  VITALS:   T(C): 36.6 (10-26-24 @ 04:36), Max: 36.8 (10-25-24 @ 21:27)  HR: 70 (10-26-24 @ 04:36) (70 - 99)  BP: 122/80 (10-26-24 @ 04:36) (122/80 - 169/103)  RR: 18 (10-26-24 @ 04:36) (18 - 18)  SpO2: 99% (10-26-24 @ 04:36) (98% - 99%)    GENERAL: In no acute distress  Respiratory: Respirations unlabored  Extremities: Warm and dry, no edema  NEURO: Alert and oriented, appropriate     LABS:  POCT Blood Glucose.: 244 mg/dL (10-26-24 @ 12:03)  POCT Blood Glucose.: 238 mg/dL (10-26-24 @ 08:08)  POCT Blood Glucose.: 274 mg/dL (10-25-24 @ 22:24)  POCT Blood Glucose.: 275 mg/dL (10-25-24 @ 17:00)  POCT Blood Glucose.: 286 mg/dL (10-25-24 @ 12:25)  POCT Blood Glucose.: 266 mg/dL (10-25-24 @ 08:12)  POCT Blood Glucose.: 287 mg/dL (10-24-24 @ 22:49)  POCT Blood Glucose.: 203 mg/dL (10-24-24 @ 18:34)                          13.4   8.23  )-----------( 227      ( 25 Oct 2024 07:13 )             42.1     10-26    135  |  99  |  18  ----------------------------<  255[H]  3.6   |  25  |  0.74    Ca    9.1      26 Oct 2024 05:53    TPro  6.2  /  Alb  3.5  /  TBili  0.6  /  DBili  x   /  AST  10  /  ALT  14  /  AlkPhos  50  10-26    LIVER FUNCTIONS - ( 26 Oct 2024 05:53 )  Alb: 3.5 g/dL / Pro: 6.2 g/dL / ALK PHOS: 50 U/L / ALT: 14 U/L / AST: 10 U/L / GGT: x           PT/INR - ( 25 Oct 2024 07:13 )   PT: 12.0 sec;   INR: 1.04 ratio        Urinalysis Basic - ( 26 Oct 2024 05:53 )    Color: x / Appearance: x / SG: x / pH: x  Gluc: 255 mg/dL / Ketone: x  / Bili: x / Urobili: x   Blood: x / Protein: x / Nitrite: x   Leuk Esterase: x / RBC: x / WBC x   Sq Epi: x / Non Sq Epi: x / Bacteria: x      Culture - Urine (collected 24 Oct 2024 12:14)  Source: Clean Catch Clean Catch (Midstream)  Final Report (25 Oct 2024 22:34):    >=3 organisms. Probable collection contamination.      A1C with Estimated Average Glucose Result: A1C with Estimated Average Glucose Result: 11.7 % (10-25-24 @ 07:13)  A1C with Estimated Average Glucose Result: 11.6 % (10-24-24 @ 13:33)

## 2024-10-26 NOTE — SWALLOW VFSS/MBS ASSESSMENT ADULT - ORAL PHASE
timely A-P transport with trace lingual residue that clears with reswallow/within functional limits A-P spillover to the pyriforms with larger or consecutive cup or straw sips; trace shallow laryngeal penetration prior to the swallow that mostly clears during the swallow and completely clears with reswallow; Trace lingual residue that clears with reswallow

## 2024-10-26 NOTE — SWALLOW VFSS/MBS ASSESSMENT ADULT - ORAL PREPARATORY PHASE
intact bolus containment and formation, and mastication of solids/Functional adequate bolus containment prior to oral transfer/Functional

## 2024-10-26 NOTE — SWALLOW VFSS/MBS ASSESSMENT ADULT - DIAGNOSTIC IMPRESSIONS
48yoF p/w slurred speech, left hand weakness, loss of balance with falling to the left, and dysphagia to liquids, found to have a R basal ganglia infarct, and age indeterminant left lacunar infarct. Pt presents with oropharyngeal swallow profile that is grossly functional for Regular texture diet and thin liquids. Oropharyngeal swallow sequence notable for trace oropharyngeal residue that clears on reswallow, and trace shallow laryngeal penetration of larger/consecutive cup/straw sips of thin liquids, which may be a variant of normal. However, given her young age and her c/o resolving dysphagia to liquids, may be a deviation from Pt's baseline. No indication for diet modification at this time. Pt advised to eat somewhat more slowly to ensure safety, especially

## 2024-10-26 NOTE — SWALLOW VFSS/MBS ASSESSMENT ADULT - NS SWALLOW VFSS REC ASPIR MON
Monitor for s/s aspiration/laryngeal penetration. If noted:  D/C p.o. intake, provide non-oral nutrition/hydration/meds, and contact this service @ [phone]/change of breathing pattern/cough/gurgly voice/fever/pneumonia/throat clearing/upper respiratory infection

## 2024-10-26 NOTE — SWALLOW VFSS/MBS ASSESSMENT ADULT - DELAYED INITIATION OF THE PHARYNGEAL SWALLOW (IN SECONDS)
very mild; Swallow triggered with bolus head in the pyriform sinuses with larger or consecutive sips via cup/straw

## 2024-10-27 LAB
GLUCOSE BLDC GLUCOMTR-MCNC: 216 MG/DL — HIGH (ref 70–99)
GLUCOSE BLDC GLUCOMTR-MCNC: 222 MG/DL — HIGH (ref 70–99)
GLUCOSE BLDC GLUCOMTR-MCNC: 223 MG/DL — HIGH (ref 70–99)
GLUCOSE BLDC GLUCOMTR-MCNC: 225 MG/DL — HIGH (ref 70–99)
GLUCOSE BLDC GLUCOMTR-MCNC: 263 MG/DL — HIGH (ref 70–99)

## 2024-10-27 RX ORDER — INSULIN LISPRO 100/ML
11 VIAL (ML) SUBCUTANEOUS
Refills: 0 | Status: DISCONTINUED | OUTPATIENT
Start: 2024-10-27 | End: 2024-10-28

## 2024-10-27 RX ORDER — INSULIN GLARGINE,HUM.REC.ANLOG 100/ML
26 VIAL (ML) SUBCUTANEOUS AT BEDTIME
Refills: 0 | Status: DISCONTINUED | OUTPATIENT
Start: 2024-10-27 | End: 2024-10-29

## 2024-10-27 RX ADMIN — Medication 26 UNIT(S): at 21:36

## 2024-10-27 RX ADMIN — NICOTINE POLACRILEX 1 PATCH: 4 GUM, CHEWING ORAL at 11:14

## 2024-10-27 RX ADMIN — Medication 80 MILLIGRAM(S): at 21:35

## 2024-10-27 RX ADMIN — CLOPIDOGREL 75 MILLIGRAM(S): 75 TABLET ORAL at 11:18

## 2024-10-27 RX ADMIN — Medication 81 MILLIGRAM(S): at 11:18

## 2024-10-27 RX ADMIN — Medication 100 MILLIGRAM(S): at 06:16

## 2024-10-27 RX ADMIN — Medication 1: at 01:58

## 2024-10-27 RX ADMIN — Medication 40 MILLIGRAM(S): at 06:16

## 2024-10-27 RX ADMIN — Medication 100 MILLIGRAM(S): at 15:08

## 2024-10-27 RX ADMIN — NICOTINE POLACRILEX 1 PATCH: 4 GUM, CHEWING ORAL at 08:01

## 2024-10-27 RX ADMIN — NICOTINE POLACRILEX 1 PATCH: 4 GUM, CHEWING ORAL at 07:15

## 2024-10-27 RX ADMIN — Medication 10 MILLIGRAM(S): at 08:44

## 2024-10-27 RX ADMIN — Medication 2: at 17:28

## 2024-10-27 RX ADMIN — Medication 11 UNIT(S): at 17:28

## 2024-10-27 RX ADMIN — NICOTINE POLACRILEX 1 PATCH: 4 GUM, CHEWING ORAL at 11:18

## 2024-10-27 RX ADMIN — Medication 2 TABLET(S): at 21:36

## 2024-10-27 RX ADMIN — Medication 11 UNIT(S): at 12:27

## 2024-10-27 RX ADMIN — Medication 8 UNIT(S): at 08:42

## 2024-10-27 RX ADMIN — Medication 2: at 08:42

## 2024-10-27 RX ADMIN — Medication 100 MILLIGRAM(S): at 21:35

## 2024-10-27 RX ADMIN — Medication 2: at 12:27

## 2024-10-27 NOTE — DISCHARGE NOTE PROVIDER - PROVIDER TOKENS
PROVIDER:[TOKEN:[[id number]]],PROVIDER:[TOKEN:[[id number]]],PROVIDER:[TOKEN:[[id number]]] PROVIDER:[TOKEN:[[id number]]],PROVIDER:[TOKEN:[[id number]]],PROVIDER:[TOKEN:[[id number]]],PROVIDER:[TOKEN:[[id number]],FOLLOWUP:[2 weeks],ESTABLISHEDPATIENT:[T]]

## 2024-10-27 NOTE — DISCHARGE NOTE PROVIDER - NSDCFUADDAPPT_GEN_ALL_CORE_FT
APPTS ARE READY TO BE MADE: [x] YES    Best Family or Patient Contact (if needed):    Additional Information about above appointments (if needed):    1:   2:   3:     Other comments or requests:    APPTS ARE READY TO BE MADE: [x] YES    Best Family or Patient Contact (if needed):    Additional Information about above appointments (if needed):    1:   2:   3:     Other comments or requests:   Prior to outreaching the patient, it was visible that the patient has secured a follow up appointment which was not scheduled by our team. Patient is scheduled on 11/12 at 11:30A at [facility] with Dr. Ana Rowe    Prior to outreaching the patient, it was visible that the patient has secured a follow up appointment which was not scheduled by our team. Patient is scheduled on 11/22 at 8:30A at [address] [facility] with Dr. Lebron Camp       Prior to outreaching the patient, it was visible that the patient has secured a follow up appointment which was not scheduled by our team. Patient is scheduled on 1/16/25 at 1PM at [address] [facility] with Dr. Eros Jennings      APPTS ARE READY TO BE MADE: [x] YES    Best Family or Patient Contact (if needed):    Additional Information about above appointments (if needed):    1:   2:   3:     Other comments or requests:   Prior to outreaching the patient, it was visible that the patient has secured a follow up appointment which was not scheduled by our team. Patient is scheduled on 11/12 at 11:30A at [address] [facility] with Dr. Ana Rowe    Prior to outreaching the patient, it was visible that the patient has secured a follow up appointment which was not scheduled by our team. Patient is scheduled on 11/22 at 8:30A at [address] [facility] with Dr. Lebron Camp       Prior to outreaching the patient, it was visible that the patient has secured a follow up appointment which was not scheduled by our team. Patient is scheduled on 1/16/25 at 1PM at [address] [facility] with Dr. Eros Jennings. Left message to offer sooner     Patient was outreached but did not answer. A voicemail was left for the patient to return our call.

## 2024-10-27 NOTE — DISCHARGE NOTE PROVIDER - NSDCCPCAREPLAN_GEN_ALL_CORE_FT
PRINCIPAL DISCHARGE DIAGNOSIS  Diagnosis: Ischemic stroke  Assessment and Plan of Treatment:       SECONDARY DISCHARGE DIAGNOSES  Diagnosis: Hyperglycemia  Assessment and Plan of Treatment:      PRINCIPAL DISCHARGE DIAGNOSIS  Diagnosis: Ischemic stroke  Assessment and Plan of Treatment: You presented with slurred speech, balance disturbance, dysphagia, left hand clumsiness  You were found to have a stroke   Neurology was consulted; continue aspirin, plavix and statin as prescribed   Cardiology was consulted; you had a heart monitor placed prior to discharge to assess for any underlying arrhythmias; follow up outpatient for results   Follow up with your PCP within one week for further outpatient management  Follow up with neuro for further outpatient management      SECONDARY DISCHARGE DIAGNOSES  Diagnosis: HTN (hypertension)  Assessment and Plan of Treatment: Continue to follow a low salt/sodium diet.  Perform physical activities as tolerated in consultation with your Primary Care Provider and physical therapist.  Take all medications as prescribed.  Follow up with your medical doctor for routine blood pressure monitoring at your next visit.  Notify your doctor if you have any of the following symptoms:  Dizziness, lightheadedness, blurry vision, headache, chest pain, or shortness of breath.    Diagnosis: DM2 (diabetes mellitus, type 2)  Assessment and Plan of Treatment: Make sure you get your HgA1c checked every three months.  If you take oral diabetes medications, check your blood glucose at least two times a day.  If you take short-acting insulin, check your blood glucose before meals and at bedtime.  It's important not to skip any meals.  Keep a log of your blood glucose results and always take it with you to your doctor appointments.  Keep a list of your current medications including over the counter medications and bring this medication list with you to all your doctor appointments.  If you have not seen your ophthalmologist this year, call for appointment.  Check your feet daily for redness, sores, or openings.  Do not self treat.  If there is no improvement in two days, call your primary care physician for an appointment     PRINCIPAL DISCHARGE DIAGNOSIS  Diagnosis: Ischemic stroke  Assessment and Plan of Treatment: You presented with slurred speech, balance disturbance, dysphagia, left hand clumsiness  You were found to have a stroke   Neurology was consulted; continue aspirin, plavix and statin as prescribed   Cardiology was consulted; you had a heart monitor placed prior to discharge to assess for any underlying arrhythmias; follow up outpatient for results   Follow up with your PCP within one week for further outpatient management  Follow up with neuro for further outpatient management      SECONDARY DISCHARGE DIAGNOSES  Diagnosis: DM2 (diabetes mellitus, type 2)  Assessment and Plan of Treatment: Make sure you get your HgA1c checked every three months.  If you take oral diabetes medications, check your blood glucose at least two times a day.  If you take short-acting insulin, check your blood glucose before meals and at bedtime.  It's important not to skip any meals.  Keep a log of your blood glucose results and always take it with you to your doctor appointments.  Keep a list of your current medications including over the counter medications and bring this medication list with you to all your doctor appointments.  If you have not seen your ophthalmologist this year, call for appointment.  Check your feet daily for redness, sores, or openings.  Do not self treat.  If there is no improvement in two days, call your primary care physician for an appointment    Diagnosis: HTN (hypertension)  Assessment and Plan of Treatment: Continue to follow a low salt/sodium diet.  Perform physical activities as tolerated in consultation with your Primary Care Provider and physical therapist.  Take all medications as prescribed.  Follow up with your medical doctor for routine blood pressure monitoring at your next visit.  Notify your doctor if you have any of the following symptoms:  Dizziness, lightheadedness, blurry vision, headache, chest pain, or shortness of breath.    Diagnosis: CVA (cerebrovascular accident)  Assessment and Plan of Treatment: Follow up outpatient with heme onc to go over additional blood workup regardling antiphospholipid syndrome and blood clotting work up

## 2024-10-27 NOTE — DISCHARGE NOTE PROVIDER - CARE PROVIDER_API CALL
Lebron Camp  Cardiac Electrophysiology  [address] [place] [address]  Phone: [phone]  Fax: [phone]  Follow Up Time:     Eros Jennings  Endocrinology/Metab/Diabetes  [address] [facility] [address] [place] [address]  Phone: [phone]  Fax: [phone]  Follow Up Time:     Ana Howe  Neurology  [address] [facility] [address]  [place] [address]  Phone: [phone]  Fax: [phone]  Follow Up Time:    Lebron Camp  Cardiac Electrophysiology  [address] [place] [address]  Phone: [phone]  Fax: [phone]  Follow Up Time:     Eros Jennings  Endocrinology/Metab/Diabetes  [address] [facility] [address] [place] [address]  Phone: [phone]  Fax: [phone]  Follow Up Time:     Ana Howe  Neurology  [address] [facility] [address]  [place] [address]  Phone: [phone]  Fax: [phone]  Follow Up Time:     Brittani Quigley  Medical Oncology  [address] [facility], [address]  [place] [address]  Phone: [phone]  Fax: [phone]  Established Patient  Follow Up Time: 2 weeks

## 2024-10-27 NOTE — CHART NOTE - NSCHARTNOTEFT_GEN_A_CORE
BREEZY HAND  Gender: Female  48y  [facility] 4MON 417 D1    Patient not seen today, chart reviewed.     POCT Blood Glucose:  225 mg/dL (10-27-24 @ 07:51)  263 mg/dL (10-27-24 @ 01:55)  207 mg/dL (10-26-24 @ 22:11)  288 mg/dL (10-26-24 @ 16:29)  244 mg/dL (10-26-24 @ 12:03)      eMAR:atorvastatin   80 milliGRAM(s) Oral (10-26-24 @ 21:45)    insulin glargine Injectable (LANTUS)   23 Unit(s) SubCutaneous (10-26-24 @ 22:12)    insulin lispro (ADMELOG) corrective regimen sliding scale   1 Unit(s) SubCutaneous (10-27-24 @ 01:58)    insulin lispro (ADMELOG) corrective regimen sliding scale   2 Unit(s) SubCutaneous (10-27-24 @ 08:42)   3 Unit(s) SubCutaneous (10-26-24 @ 17:16)   2 Unit(s) SubCutaneous (10-26-24 @ 12:13)    insulin lispro Injectable (ADMELOG)   8 Unit(s) SubCutaneous (10-27-24 @ 08:42)   8 Unit(s) SubCutaneous (10-26-24 @ 17:16)   8 Unit(s) SubCutaneous (10-26-24 @ 12:14)    Endocrinology following patient for T2DM management. In the last 24hrs BG levels have been 207-288mg/dL. FBG elevated to 225mg/dL this am. BGs >200mg/dL post-prandial and overnight. Will increase Lantus to 26u QHS and increase Admelog with meals to 11u TID AC (to be held if patient NPO). No hypoglycemia. Endocrine will closely monitor BG levels.

## 2024-10-27 NOTE — DISCHARGE NOTE PROVIDER - NSDCMRMEDTOKEN_GEN_ALL_CORE_FT
aspirin 81 mg oral tablet: 1 tab(s) orally once a day  cetirizine 10 mg oral tablet: 1 tab(s) orally once a day as needed for  allergy symptoms  OTC Supplements: Biotin, Vitamin B12  Tylenol 500 mg oral tablet: 2 tab(s) orally once a day as needed for  mild pain   alcohol swabs: Apply topically to affected area 4 times a day  aspirin 81 mg oral tablet: 1 tab(s) orally once a day  atorvastatin 80 mg oral tablet: 1 tab(s) orally once a day (at bedtime)  cetirizine 10 mg oral tablet: 1 tab(s) orally once a day as needed for  allergy symptoms  clopidogrel 75 mg oral tablet: 1 tab(s) orally once a day Last day 11/14  Freestyle Ki 3 Mobile: Dispense 1 Mobile  Freestyle Ki 3 Sensors: Please change every 14 days  glucometer (per patient&#x27;s insurance): Test blood sugars four times a day. Dispense #1 glucometer.  Insulin Pen Needles, 4mm: 1 application subcutaneously 4 times a day. ** Use with insulin pen **  labetalol 100 mg oral tablet: 1 tab(s) orally 3 times a day  lancets: 1 application subcutaneously 4 times a day  Lantus Solostar Pen 100 units/mL subcutaneous solution: 26 unit(s) subcutaneous once a day (at bedtime) unit(s) subcutaneous once a day  lisinopril 10 mg oral tablet: 1 tab(s) orally once a day  metFORMIN 500 mg oral tablet: 1 tab(s) orally 2 times a day (with meals)  Mounjaro 2.5 mg/0.5 mL subcutaneous solution: 2.5 milligram(s) subcutaneously once a week  test strips (per patient&#x27;s insurance): 1 application subcutaneously 4 times a day. ** Compatible with patient&#x27;s glucometer **   aspirin 81 mg oral tablet: 1 tab(s) orally once a day  atorvastatin 80 mg oral tablet: 1 tab(s) orally once a day (at bedtime)  cetirizine 10 mg oral tablet: 1 tab(s) orally once a day as needed for  allergy symptoms  clopidogrel 75 mg oral tablet: 1 tab(s) orally once a day Last day 11/14  Dexcom G7 : Use as directed  Dexcom G7 Sensors: Change every 10 days  labetalol 100 mg oral tablet: 1 tab(s) orally 3 times a day  Lantus Solostar Pen 100 units/mL subcutaneous solution: 26 unit(s) subcutaneous once a day (at bedtime) unit(s) subcutaneous once a day  lisinopril 10 mg oral tablet: 1 tab(s) orally once a day  metFORMIN 500 mg oral tablet: 1 tab(s) orally 2 times a day (with meals)  Mounjaro 2.5 mg/0.5 mL subcutaneous solution: 2.5 milligram(s) subcutaneously once a week

## 2024-10-27 NOTE — DISCHARGE NOTE PROVIDER - NSDCCPTREATMENT_GEN_ALL_CORE_FT
PRINCIPAL PROCEDURE  Procedure: MR head wo con  Findings and Treatment: Your MRI of the head showed the following IMPRESSION:  1.  Right basal ganglia infarct, 24 hours to 7 days in age.  2.  Chronic infarct in the right corona radiata and mild small vessel   disease.      SECONDARY PROCEDURE  Procedure: Complete transthoracic echocardiography (TTE)  Findings and Treatment: Your Echo showed the followin. Left ventricular cavity is normal in size. Left ventricular wall thickness is normal. Left ventricular systolic function is hyperdynamic with an ejection fraction of 81 % by Grey's method of disks. There are no regional wall motion abnormalities seen.   2. Normal left ventricular diastolic function, with normal left ventricular filling pressure.   3. Normal right ventricular cavity size, with normal wall thickness, and normal right ventricular systolic function.   4. Normal left and right atrial size.   5. No significant valvular disease.   6. No pericardial effusion seen.   7. Agitated saline injection was negative for intracardiac shunt.   8. No prior echocardiogram is available for comparison.   9. There is increased LV mass and concentric hypertrophy.  10. There is no evidence of a left ventricular thrombus.

## 2024-10-27 NOTE — DISCHARGE NOTE PROVIDER - NSFOLLOWUPCLINICS_GEN_ALL_ED_FT
[facility] - Primary Care  Primary Care  [address] [place] [address]  Phone: [phone]  Fax:

## 2024-10-27 NOTE — DISCHARGE NOTE PROVIDER - CARE PROVIDERS DIRECT ADDRESSES
[email or web address]

## 2024-10-27 NOTE — DISCHARGE NOTE PROVIDER - HOSPITAL COURSE
HPI: 48y F pmh prediabetes and HTN not on medications p/w slurred speech, left hand weakness, loss of balance with falling to the left, and choking on liquids since waking up 2 days ago. Symptoms have gradually improved and now only complains of mild slurred speech, difficulty swallowing, and left finger clumsiness. Also noticed some difficulty with reading, skips over some words when reading fast. No gait disturbance. Has not seen a PCP or gone for medical check up in a long time. Patient denies history of stroke or similar symptoms. Patient is a current smoker for 20 years, 5-6 cigarettes a day.     Hospital Course:     Problem/Plan - 1:  ·  Problem: CVA (cerebrovascular accident).   ·  Plan: P/w slurred speech, balance disturbance, dysphagia, left hand clumsiness. LKN 2d ago   - EKG: sinus tachy    - Labs: cbc unremarkable, chem marked hyperglycemia ( iso likely DM), Trop neg, UA: noninfectious, Flu/RSV/COVID: neg   - CXR: clear   - CTH: acute right basal ganglia infarct and age indeterminate left lacunar infarct  - CTA H/Neck:  no significant stenosis or vascular lesion   - NIHSS: 0.   LKN: 2d ago    - Not tPA candidate(outside window), Not mechanical thrombectomy candidate (no large vessel occlusion on CTA)  - ASA 81 mg and Plavix 75mg QD for 21 days then c/w aspirin only  - Atorvastatin 80mg qd, goal LDL < 70)  - Check HgbA1C, lipid panel (ordered)  - BP control, goal <130/80    - MRI brain  (ordered)   - TTE EF 81%, no WMA  - VS & Neuro checks q4h  - Fall & aspiration precautions  - Passed dysphagia screen but still endorsing discomfort w/ swallowing will consult  SLP    - PT/OT consult (ordered)   - Appreciate Neuro rec.     Problem/Plan - 2:  ·  Problem: DM2 (diabetes mellitus, type 2).   ·  Plan: - hx/o preDM p/w elevated serum glucose > 400. Pt likely w/new dx'd DM2   - Low ISS ACHS  - DASH/CC diet     Problem/Plan - 3:  ·  Problem: HTN (hypertension).   ·  Plan: - BP control, goal <130/80    - Start Lisinopril 5mg  qd  - Start HTZ 12.5mg qd  - Titrate BP meds prn.     Problem/Plan - 4:  ·  Problem: Morbid obesity.   ·  Plan: - Diet and lifestyle modification encouraged.     Problem/Plan - 5:  ·  Problem: Prophylactic measure.   ·  Plan: - DVT ppx: Lovenox SQ   - Diet: DASH/ CC  - PT / OT consult-NO skilled needs      Important Medication Changes and Reason:    Active or Pending Issues Requiring Follow-up:    Advanced Directives:   [x] Full code  [ ] DNR  [ ] Hospice    Discharge Diagnoses:         HPI:   48y F pmh prediabetes and HTN not on medications p/w slurred speech, left hand weakness, loss of balance with falling to the left, and choking on liquids since waking up 2 days ago. Symptoms have gradually improved and now only complains of mild slurred speech, difficulty swallowing, and left finger clumsiness. Also noticed some difficulty with reading, skips over some words when reading fast. No gait disturbance. Has not seen a PCP or gone for medical check up in a long time. Patient denies history of stroke or similar symptoms. Patient is a current smoker for 20 years, 5-6 cigarettes a day.     Hospital Course:  Patient presented with slurred speech, balance disturbance, dysphagia, left hand clumsiness. LKN 2d ago. CT head revealed acute right basal ganglia infarct and age indeterminate left lacunar infarct. CTA H/Neck revealed no significant stenosis or vascular lesion. Neurology was consulted; Not tPA candidate(outside window), Not mechanical thrombectomy candidate (no large vessel occlusion on CTA). Continue with aspirin and plavix for 21 days and then continue with aspirin only; continue with atorvastatin  MRI revealed right basal ganglia infarct, 24 hours to 7 days in age and chronic infarct in the right corona radiata and mild small vessel disease. TTE w/ EF 81% and no wall motion abnormalities.  Cardiology and EP consulted; patient received ZioPatch prior to discharge to evaluate for arrhythmias. Patient to follow up with results outpatient  Hospital course complicated by hypertensive urgency; s/p labetalol and hydralazine IVP in ED; c/w HCTZ and lisinopril  Endocrinology consulted for diabetes with hyperglycemia; patient started on insulin    Important Medication Changes and Reason:  >Continue insulin regimen as prescribed   >Continue BP meds   >Continue ASA, plavix and statin     Active or Pending Issues Requiring Follow-up:  >PCP follow up within one week for further outpatient management   >Endocrinology follow up for further outpatient management of DM   >Neurology follow up for further outpatient management of stroke   >Cardiology/EP follow up for further outpatient management including ZioPatch results    Advanced Directives:   [X] Full code  [ ] DNR  [ ] Hospice    Discharge Diagnoses:  >CVA  >DM  >HTN     Discharge/dispo/med rec discussed with medical attending Dr. Saba. Patient is medically cleared for discharge with outpatient follow up

## 2024-10-27 NOTE — DISCHARGE NOTE PROVIDER - NSDCFUSCHEDAPPT_GEN_ALL_CORE_FT
Lebron Camp  [facility]  ELECTROPH [address]  Scheduled Appointment: 11/22/2024    [facility]  ENDOCRIN [address]  Scheduled Appointment: 01/16/2025     Ana Elizondo  [facility]  NEUROSURG [address] [facility]  Scheduled Appointment: 11/12/2024    Lebron Camp  [facility]  ELECTROPH [address]  Scheduled Appointment: 11/22/2024    [facility]  ENDOCRIN [address]  Scheduled Appointment: 01/16/2025

## 2024-10-28 DIAGNOSIS — E66.01 MORBID (SEVERE) OBESITY DUE TO EXCESS CALORIES: ICD-10-CM

## 2024-10-28 DIAGNOSIS — F17.200 NICOTINE DEPENDENCE, UNSPECIFIED, UNCOMPLICATED: ICD-10-CM

## 2024-10-28 LAB
AMPHET UR-MCNC: NEGATIVE NG/ML — SIGNIFICANT CHANGE UP
APTT BLD: 30.9 SEC — SIGNIFICANT CHANGE UP (ref 24.5–35.6)
BARBITURATES UR QL SCN: NEGATIVE NG/ML — SIGNIFICANT CHANGE UP
BARBITURATES UR-MCNC: NEGATIVE NG/ML — SIGNIFICANT CHANGE UP
BENZODIAZ UR-MCNC: NEGATIVE NG/ML — SIGNIFICANT CHANGE UP
COCAINE METAB.OTHER UR-MCNC: NEGATIVE NG/ML — SIGNIFICANT CHANGE UP
CREATININE, URINE THERAPEUTIC: 134.3 MG/DL — SIGNIFICANT CHANGE UP (ref 20–300)
FENTANYL UR QL SCN: NEGATIVE NG/ML — SIGNIFICANT CHANGE UP
GLUCOSE BLDC GLUCOMTR-MCNC: 119 MG/DL — HIGH (ref 70–99)
GLUCOSE BLDC GLUCOMTR-MCNC: 149 MG/DL — HIGH (ref 70–99)
GLUCOSE BLDC GLUCOMTR-MCNC: 159 MG/DL — HIGH (ref 70–99)
GLUCOSE BLDC GLUCOMTR-MCNC: 180 MG/DL — HIGH (ref 70–99)
GLUCOSE BLDC GLUCOMTR-MCNC: 196 MG/DL — HIGH (ref 70–99)
GLUCOSE BLDC GLUCOMTR-MCNC: 206 MG/DL — HIGH (ref 70–99)
METHADONE UR QL SCN: NEGATIVE NG/ML — SIGNIFICANT CHANGE UP
OPIATES UR-MCNC: NEGATIVE NG/ML — SIGNIFICANT CHANGE UP
OXYCODONE UR QL SCN: NEGATIVE NG/ML — SIGNIFICANT CHANGE UP
PCP UR-MCNC: NEGATIVE NG/ML — SIGNIFICANT CHANGE UP
PH, URINE RESULT: 8.3 — SIGNIFICANT CHANGE UP (ref 4.5–8.9)
THC UR QL: NEGATIVE NG/ML — SIGNIFICANT CHANGE UP

## 2024-10-28 PROCEDURE — 99232 SBSQ HOSP IP/OBS MODERATE 35: CPT

## 2024-10-28 PROCEDURE — 99222 1ST HOSP IP/OBS MODERATE 55: CPT

## 2024-10-28 RX ORDER — INSULIN LISPRO 100/ML
13 VIAL (ML) SUBCUTANEOUS
Refills: 0 | Status: DISCONTINUED | OUTPATIENT
Start: 2024-10-28 | End: 2024-10-29

## 2024-10-28 RX ADMIN — NICOTINE POLACRILEX 1 PATCH: 4 GUM, CHEWING ORAL at 12:11

## 2024-10-28 RX ADMIN — Medication 650 MILLIGRAM(S): at 21:54

## 2024-10-28 RX ADMIN — NICOTINE POLACRILEX 1 PATCH: 4 GUM, CHEWING ORAL at 11:00

## 2024-10-28 RX ADMIN — Medication 100 MILLIGRAM(S): at 06:06

## 2024-10-28 RX ADMIN — POLYETHYLENE GLYCOL 3350 17 GRAM(S): 17 POWDER, FOR SOLUTION ORAL at 07:51

## 2024-10-28 RX ADMIN — Medication 100 MILLIGRAM(S): at 14:26

## 2024-10-28 RX ADMIN — CLOPIDOGREL 75 MILLIGRAM(S): 75 TABLET ORAL at 12:11

## 2024-10-28 RX ADMIN — NICOTINE POLACRILEX 1 PATCH: 4 GUM, CHEWING ORAL at 19:22

## 2024-10-28 RX ADMIN — Medication 2: at 08:47

## 2024-10-28 RX ADMIN — Medication 13 UNIT(S): at 13:10

## 2024-10-28 RX ADMIN — Medication 81 MILLIGRAM(S): at 12:11

## 2024-10-28 RX ADMIN — Medication 10 MILLIGRAM(S): at 06:07

## 2024-10-28 RX ADMIN — Medication 11 UNIT(S): at 08:47

## 2024-10-28 RX ADMIN — Medication 13 UNIT(S): at 17:09

## 2024-10-28 RX ADMIN — Medication 650 MILLIGRAM(S): at 22:26

## 2024-10-28 RX ADMIN — Medication 26 UNIT(S): at 21:55

## 2024-10-28 RX ADMIN — Medication 100 MILLIGRAM(S): at 21:59

## 2024-10-28 RX ADMIN — Medication 1: at 13:10

## 2024-10-28 RX ADMIN — NICOTINE POLACRILEX 1 PATCH: 4 GUM, CHEWING ORAL at 07:00

## 2024-10-28 RX ADMIN — Medication 80 MILLIGRAM(S): at 21:55

## 2024-10-28 RX ADMIN — Medication 40 MILLIGRAM(S): at 06:05

## 2024-10-28 NOTE — PROGRESS NOTE ADULT - PROBLEM SELECTOR PROBLEM 2
DM2 (diabetes mellitus, type 2)
DM2 (diabetes mellitus, type 2)
HTN (hypertension)
HTN (hypertension)
DM2 (diabetes mellitus, type 2)
DM2 (diabetes mellitus, type 2)

## 2024-10-28 NOTE — PROGRESS NOTE ADULT - SUBJECTIVE AND OBJECTIVE BOX
Patient is a 48y old  Female who presents with a chief complaint of Slurred speech, imbalance (28 Oct 2024 15:59)      SUBJECTIVE / OVERNIGHT EVENTS:     MEDICATIONS  (STANDING):  aspirin enteric coated 81 milliGRAM(s) Oral daily  atorvastatin 80 milliGRAM(s) Oral at bedtime  clopidogrel Tablet 75 milliGRAM(s) Oral daily  dextrose 5%. 1000 milliLiter(s) (100 mL/Hr) IV Continuous <Continuous>  dextrose 5%. 1000 milliLiter(s) (50 mL/Hr) IV Continuous <Continuous>  dextrose 50% Injectable 25 Gram(s) IV Push once  dextrose 50% Injectable 12.5 Gram(s) IV Push once  dextrose 50% Injectable 25 Gram(s) IV Push once  enoxaparin Injectable 40 milliGRAM(s) SubCutaneous every 24 hours  glucagon  Injectable 1 milliGRAM(s) IntraMuscular once  influenza   Vaccine 0.5 milliLiter(s) IntraMuscular once  insulin glargine Injectable (LANTUS) 26 Unit(s) SubCutaneous at bedtime  insulin lispro (ADMELOG) corrective regimen sliding scale   SubCutaneous three times a day before meals  insulin lispro (ADMELOG) corrective regimen sliding scale   SubCutaneous <User Schedule>  insulin lispro Injectable (ADMELOG) 13 Unit(s) SubCutaneous three times a day before meals  labetalol 100 milliGRAM(s) Oral three times a day  lisinopril 10 milliGRAM(s) Oral daily  nicotine -  14 mG/24Hr(s) Patch 1 Patch Transdermal daily  senna 2 Tablet(s) Oral at bedtime    MEDICATIONS  (PRN):  acetaminophen     Tablet .. 650 milliGRAM(s) Oral every 6 hours PRN Temp greater or equal to 38C (100.4F), Mild Pain (1 - 3)  aluminum hydroxide/magnesium hydroxide/simethicone Suspension 30 milliLiter(s) Oral every 4 hours PRN Dyspepsia  dextrose Oral Gel 15 Gram(s) Oral once PRN Blood Glucose LESS THAN 70 milliGRAM(s)/deciliter  melatonin 3 milliGRAM(s) Oral at bedtime PRN Insomnia  ondansetron Injectable 4 milliGRAM(s) IV Push every 8 hours PRN Nausea and/or Vomiting  polyethylene glycol 3350 17 Gram(s) Oral daily PRN Constipation      CAPILLARY BLOOD GLUCOSE      POCT Blood Glucose.: 149 mg/dL (28 Oct 2024 21:42)  POCT Blood Glucose.: 119 mg/dL (28 Oct 2024 16:39)  POCT Blood Glucose.: 180 mg/dL (28 Oct 2024 13:08)  POCT Blood Glucose.: 159 mg/dL (28 Oct 2024 11:44)  POCT Blood Glucose.: 206 mg/dL (28 Oct 2024 08:18)  POCT Blood Glucose.: 196 mg/dL (28 Oct 2024 03:25)    I&O's Summary    T(C): 36.8 (10-28-24 @ 20:57), Max: 37.2 (10-28-24 @ 14:27)  HR: 92 (10-28-24 @ 20:57) (83 - 92)  BP: 165/98 (10-28-24 @ 20:57) (158/87 - 168/83)  RR: 18 (10-28-24 @ 20:57) (18 - 18)  SpO2: 98% (10-28-24 @ 20:57) (96% - 98%)    PHYSICAL EXAM:  GENERAL: NAD, well-developed  HEAD:  Atraumatic, Normocephalic  EYES: EOMI, PERRLA, conjunctiva and sclera clear  NECK: Supple, No JVD  CHEST/LUNG: Clear to auscultation bilaterally; No wheeze  HEART: Regular rate and rhythm; No murmurs, rubs, or gallops  ABDOMEN: Soft, Nontender, Nondistended; Bowel sounds present  EXTREMITIES:  2+ Peripheral Pulses, No clubbing, cyanosis, or edema  PSYCH: AAOx3  NEUROLOGY: non-focal  SKIN: No rashes or lesions    no new labs       RADIOLOGY & ADDITIONAL TESTS:    Imaging Personally Reviewed:    Consultant(s) Notes Reviewed:      Care Discussed with Consultants/Other Providers:

## 2024-10-28 NOTE — PROGRESS NOTE ADULT - PROBLEM SELECTOR PROBLEM 3
HTN (hypertension)
Hyperlipidemia
HTN (hypertension)
HTN (hypertension)
Hyperlipidemia
HTN (hypertension)

## 2024-10-28 NOTE — PROGRESS NOTE ADULT - PROBLEM SELECTOR PLAN 1
P/w slurred speech, balance disturbance, dysphagia, left hand clumsiness. LKN 2d ago   - EKG: sinus tachy    - Labs: cbc unremarkable, chem marked hyperglycemia ( iso likely DM), Trop neg, UA: noninfectious, Flu/RSV/COVID: neg   - CXR: clear   - CTH: acute right basal ganglia infarct and age indeterminate left lacunar infarct  - CTA H/Neck:  no significant stenosis or vascular lesion   - NIHSS: 0.   LKN: 2d ago    - Not tPA candidate(outside window), Not mechanical thrombectomy candidate (no large vessel occlusion on CTA)  - ASA 81 mg and Plavix 75mg QD for 21 days then c/w aspirin only  - Atorvastatin 80mg qd, goal LDL < 70)  - Check HgbA1C, lipid panel (ordered)  - BP control, goal <130/80    - MRI brain  (ordered)   - TTE (ordered), Telemetry   - VS & Neuro checks q4h  - Fall & aspiration precautions  - Passed dysphagia screen but still endorsing discomfort w/ swallowing will consult  SLP    - PT/OT consult (ordered)   - Appreciate Neuro rec
- Check BG TID AC, HS, and 2AM. Q6H if NPO  - C/w Lantus 26u HS  - Adjust Admelog to 13 u TID AC (HOLD if NPO) for now  - C/w low dose Admelog correctional scales TID AC, HS, and 2AM  - Please teach and allow pt to do own finger sticks and insulin injections under RN supervision  Please teach use of insulin pen  Please document teach back  -Will follow  Discharge Planning:  Patient to go home on Basal insulin (doses TBD closer to discharge) plus Metformin 500mg twice daily x4 weeks then increased to 1g twice daily outpatient. Please also start a GLP1 weekly whichever covered by insurance. Pt reports her insurance covers Mounjaro (2.5mg subq once weekly) to be titrated up outpatient as tolerated.  Please write Rxs for: Solo Star Insulin pen/[place] insulin pen needles/glucose meter/strips/lancets. Can prescribe any insulin analog equivalent cover by pt's insurance  Patient would benefit from CGM- please place order for CGM (Dexcom G7 or Freestyle Ki 3 whichever is covered by patient insurance) sensor x3. Will supply patient with sample/set up gus prior to discharge.   Pt to report to PCP or Endocrinologist if BG <70mg/dL x1 or >200mg/dL consistently or >400mg/dL x1.   Pt wishes to f/u with Dr Jennings in [facility] practice> will send email to practice to coordinate f/u apt  Recommend routine outpatient ophthalmology and podiatry follow up.
Inpatient Plan:  - Check BG TID AC, HS, and 2AM  - Adjust Lantus to 23u HS  - Adjust Admelog to 8u TID AC (HOLD if NPO)  - C/w low dose Admelog correctional scales TID AC, HS, and 2AM  - RN to provide patient education regarding insulin pen injections and BG checks with glucometer prior to discharge    Discharge Planning:  - Patient to go home on Basal insulin (doses TBD closer to discharge) plus Metformin 500mg twice daily x4 weeks then increased to 1g twice daily outpatient. Please also start a GLP1 weekly whichever covered by insurance (Ozempic 0.25mg subq once weekly OR Trulicity 0.75mg subq once weekly OR Mounjaro 2.5mg subq once weekly) to be titrated up outpatient as tolerated.  - Patient would benefit from CGM- please place order for CGM (Dexcom G7 or Freestyle Ki 3 whichever is covered by patient insurance)- please send for reader plus sensor x3. Will supply patient with sample/set up gus prior to discharge. Patient should check BG TID AC and HS. Please tell patient to contact Endocrinologist if BG <70mg/dL x1 or >200mg/dL consistently or >400mg/dL x1.   - Endocrine follow up: needs to establish care at [address] [place] [address] Endocrinology ([phone])- added to appt list as of 10/26.   - Recommend routine outpatient ophthalmology and podiatry follow up.    Pt will need RX for basal insulin pen (ie. Basaglar, Lantus, Tresiba, Toujeo, Levemir) depending on insurance coverage; please send test scripts to see which is covered. Please send prescriptions for diabetes supplies (glucometer, test strips, lancets, alcohol swabs, insulin pen needles).
P/w slurred speech, balance disturbance, dysphagia, left hand clumsiness. LKN 2d ago   - EKG: sinus tachy    - Labs: cbc unremarkable, chem marked hyperglycemia ( iso likely DM), Trop neg, UA: noninfectious, Flu/RSV/COVID: neg   - CXR: clear   - CTH: acute right basal ganglia infarct and age indeterminate left lacunar infarct  - CTA H/Neck:  no significant stenosis or vascular lesion   - NIHSS: 0.   LKN: 2d ago    - Not tPA candidate(outside window), Not mechanical thrombectomy candidate (no large vessel occlusion on CTA)  - ASA 81 mg and Plavix 75mg QD for 21 days then c/w aspirin only  - Atorvastatin 80mg qd, goal LDL < 70)  - Check HgbA1C, lipid panel (ordered)  - BP control, goal <130/80    - MRI brain  (ordered)   - TTE (ordered), Telemetry   - VS & Neuro checks q4h  - Fall & aspiration precautions  - Passed dysphagia screen but still endorsing discomfort w/ swallowing will consult  SLP    - PT/OT consult (ordered)   - Appreciate Neuro rec

## 2024-10-28 NOTE — CONSULT NOTE ADULT - SUBJECTIVE AND OBJECTIVE BOX
CHIEF COMPLAINT: Slurred speech    HPI:  Patient is a 49 y/o female with PMH of DM, HTN, p/w slurred speech, left hand weakness, loss of balance with falling to the left, and choking on liquids 2 days prior to presentation. Symptoms have gradually improved and now only complains of mild slurred speech, difficulty swallowing, and left finger clumsiness. Also noticed some difficulty with reading, skips over some words when reading fast. No gait disturbance. Has not seen a PCP or gone for medical check up in a long time. Patient denies history of stroke or similar symptoms. Patient is a current smoker for 20 years, 5-6 cigarettes a day. CTH findings consistent with an acute right basal ganglia infarct and age indeterminant left lacunar infarct. Neuro on board and patient started on DAPT. EP consulted for potential cardiac monitor ILR vs external.    MEDICATIONS:  aspirin enteric coated 81 milliGRAM(s) Oral daily  clopidogrel Tablet 75 milliGRAM(s) Oral daily  enoxaparin Injectable 40 milliGRAM(s) SubCutaneous every 24 hours  labetalol 100 milliGRAM(s) Oral three times a day  lisinopril 10 milliGRAM(s) Oral daily  acetaminophen     Tablet .. 650 milliGRAM(s) Oral every 6 hours PRN  melatonin 3 milliGRAM(s) Oral at bedtime PRN  ondansetron Injectable 4 milliGRAM(s) IV Push every 8 hours PRN  aluminum hydroxide/magnesium hydroxide/simethicone Suspension 30 milliLiter(s) Oral every 4 hours PRN  polyethylene glycol 3350 17 Gram(s) Oral daily PRN  senna 2 Tablet(s) Oral at bedtime  atorvastatin 80 milliGRAM(s) Oral at bedtime  dextrose 50% Injectable 25 Gram(s) IV Push once  dextrose 50% Injectable 25 Gram(s) IV Push once  dextrose 50% Injectable 12.5 Gram(s) IV Push once  dextrose Oral Gel 15 Gram(s) Oral once PRN  glucagon  Injectable 1 milliGRAM(s) IntraMuscular once  insulin glargine Injectable (LANTUS) 26 Unit(s) SubCutaneous at bedtime  insulin lispro (ADMELOG) corrective regimen sliding scale   SubCutaneous <User Schedule>  insulin lispro (ADMELOG) corrective regimen sliding scale   SubCutaneous three times a day before meals  insulin lispro Injectable (ADMELOG) 13 Unit(s) SubCutaneous three times a day before meals  dextrose 5%. 1000 milliLiter(s) IV Continuous <Continuous>  dextrose 5%. 1000 milliLiter(s) IV Continuous <Continuous>  influenza   Vaccine 0.5 milliLiter(s) IntraMuscular once    REVIEW OF SYSTEMS:  See HPI, otherwise ROS negative.    PHYSICAL EXAM:  T(C): 37.2 (10-28-24 @ 14:27), Max: 37.2 (10-28-24 @ 14:27)  HR: 83 (10-28-24 @ 14:27) (68 - 85)  BP: 168/83 (10-28-24 @ 14:27) (143/85 - 168/83)  RR: 18 (10-28-24 @ 14:27) (18 - 18)  SpO2: 96% (10-28-24 @ 14:27) (96% - 98%)  Wt(kg): --  I&O's Summary      Appearance: Alert. NAD	  HEENT:   NC/AT	  Cardiovascular: +S1S2 RRR no m/g/r  Respiratory: CTA B/L	  Psychiatry: A & O x 3, Mood & affect appropriate  Neurologic: Non-focal  Extremities: No edema BLE  Vascular: Peripheral pulses palpable 2+ bilaterally    TELEMETRY: Sinus in the 70s  	    ECHO:  TRANSTHORACIC ECHOCARDIOGRAM REPORT  ________________________________________________________________________________                                      _______       Pt. Name:       BREEZY HNAD Study Date:    10/25/2024  MRN:  [id number]             YOB: 1975  Accession #:    [id number]              Age:           48 years  Account#:       [id number]           Gender:        F  Heart Rate:     96 bpm                 Height:        67.00 in (170.18 cm)  Rhythm:         sinus rhythm           Weight:        248.00 lb (112.49 kg)  Blood Pressure: 139/79 mmHg            BSA/BMI:       2.22 m² / 38.84 kg/m²  ________________________________________________________________________________________  ReferringPhysician:    [id number] Jailene Saba  Interpreting Physician: Peter Solorzano M.D.  Primary Sonographer:    Lulu Ibanez [facility]    CPT:                ECHO TTE WITH CON COMP W DOPP - .m;DEFINITY ECHO                      CONTRAST PER ML - .m;DEFINITY ECHO CONTRAST PER ML                      WASTED - .m  Indication(s):      Cerebral infarction, unspecified - I63.9  Procedure:          Transthoracic echocardiogram with 2-D, M-mode and complete                      spectral and color flow Doppler.  Ordering Location:  [facility]  Admission Status:   Inpatient  Contrast Injection: Verbal consent was obtained for injection of Ultrasonic                      Enhancing Agent following a discussion of risks and                      benefits.                      Endocardial visualization enhanced with 2 ml of Definity                      Ultrasound enhancing agent (Lot#:[id number] Exp.Date:09/2025                      Discarded Dose:8ml).  UEA Reaction:       Patient had no adverse reaction after injection of                      Ultrasound Enhancing Agent.  Agitated Saline:    Injection with agitated saline was performed to evaluate for                      intracardiac shunting.  Study Information:  Image quality for this study isfair.    _______________________________________________________________________________________     CONCLUSIONS:      1. Left ventricular cavity is normal in size. Left ventricular wall thickness is normal. Left ventricular systolic function is hyperdynamic with an ejection fraction of 81 % by Grey's method of disks. There are no regional wall motion abnormalities seen.   2. Normal left ventricular diastolic function, with normal left ventricular filling pressure.   3. Normal right ventricular cavity size, with normal wall thickness, and normal right ventricular systolic function.   4. Normal left and right atrial size.   5. No significant valvular disease.   6. No pericardial effusion seen.   7. Agitated saline injection was negative forintracardiac shunt.   8. No prior echocardiogram is available for comparison.   9. There is increased LV mass and concentric hypertrophy.  10. There is no evidence of a left ventricular thrombus.    ________________________________________________________________________________________  FINDINGS:     Left Ventricle:  The left ventricular cavity is normal in size. Left ventricular wall thickness is normal. Left ventricular systolic function is hyperdynamic with a calculated ejection fraction of 81 % by the Grey's biplane method of disks. There are no regional wall motion abnormalities seen. Mild to moderate left ventricular hypertrophy. There is increased LV mass and concentric hypertrophy. There is no evidence of a left ventricular thrombus. There is normal left ventricular diastolic function, with normal left ventricular filling pressure.     Right Ventricle:  The right ventricular cavity is normal in size, with normal wall thickness and right ventricular systolic function is normal.Tricuspid annular plane systolic excursion (TAPSE) is 2.9 cm (normal >=1.7 cm).     Left Atrium:  The left atrium is normal in size with an indexed volume of 13.13 ml/m².     Right Atrium:  The right atrium is normal in size with an indexed volume of12.50 ml/m².     Interatrial Septum:  The interatrial septum appears intact. Agitated saline injection was negative for intracardiac shunt.     Aortic Valve:  The aortic valve is tricuspid with normal leaflet excursion. There is no aortic valve stenosis. There is no evidence of aortic regurgitation.     Mitral Valve:  Structurally normal mitral valve with normal leaflet excursion. There is no mitral valve stenosis. There is trace mitral regurgitation.     Tricuspid Valve:  Structurally normal tricuspid valve with normal leaflet excursion. There is trace tricuspid regurgitation.     Pulmonic Valve:  Structurally normal pulmonic valve with normal leaflet excursion. There is trace pulmonic regurgitation.     Aorta:  The aortic root appears normal in size. The aortic root at the sinuses of Valsalva is normal in size, measuring 3.00 cm (indexed 1.35 cm/m²). The ascending aorta is normal in size, measuring 3.40 cm (indexed 1.53 cm/m²).     Pericardium:  No pericardial effusion seen.     Systemic Veins:  The inferior vena cava is normal in size (normal <2.1cm) with normal inspiratory collapse (normal >50%) consistent with normal right atrial pressure (~3, range 0-5mmHg).  ____________________________________________________________________  QUANTITATIVE DATA:  Left Ventricle Measurements: (Indexed to BSA)     IVSd (2D):   1.4 cm  LVPWd (2D):  1.2 cm  LVIDd (2D):  4.7 cm  LVIDs (2D):  2.9 cm  LV Mass:     241 g  108.8 g/m²  LV Vol d, MOD A2C: 31.5 ml 14.22 ml/m²  LV Vol d, MOD A4C: 68.3 ml 30.83 ml/m²  LV Vol d, MOD BP:  48.1 ml 21.71 ml/m²  LV Vol s, MOD A2C: 6.1 ml  2.75 ml/m²  LV Vol s, MOD A4C: 11.5 ml 5.19 ml/m²  LV Vol s, MOD BP:  9.2 ml  4.17 ml/m²  LVOT SV MOD BP:    38.9 ml  LV EF% MOD BP:     81 %     MV E Vmax:    0.69 m/s  MV A Vmax:    1.13 m/s  MV E/A:       0.61  e' lateral:   7.29 cm/s  e' medial:    6.31 cm/s  E/e' lateral: 9.49  E/e' medial:  10.97  E/e' Average: 10.18  MV DT:        161 msec    Aorta Measurements: (Normal range) (Indexed to BSA)     Ao Root d 3.00 cm (2.7 - 3.3 cm) 1.35 cm/m²  Ao Asc d, 2D: 3.40  Ao Asc prox:  3.40 cm                1.53 cm/m²            Left Atrium Measurements: (Indexed to BSA)  LA Diam 2D:        3.50 cm  LA Vol s, MOD A4C: 33.80 ml.  LA Vol s, MOD A2C: 25.70 ml.  LA Vol s, MOD BP:  29.10 ml  13.13 ml/m²         Right Ventricle Measurements: Right Atrial Measurements:     TAPSE:           2.9 cm       RA Vol:       27.70 ml  RV S' Vmax:      12.90 cm/s   RA Vol Index: 12.50 ml/m²  RV Base (RVID1): 2.8 cm  RV Mid (RVID2):  2.0 cm       LVOT / RVOT/ Qp/Qs Data: (Indexed to BSA)  LVOT Diameter:  2.10 cm  LVOT Area:      3.46 cm²  LVOT Vmax:      1.02 m/s  LVOT Vmn:       0.707 m/s  LVOT VTI:       19.40 cm  LVOT peak grad: 4 mmHg  LVOT mean grad: 2.0 mmHg  LVOT SV:        67.2 ml   30.33 ml/m²    Mitral Valve Measurements:     MV E Vmax: 0.7 m/s  MV A Vmax: 1.1 m/s  MV E/A:    0.6       Tricuspid Valve Measurements:     RA Pressure: 3 mmHg    ________________________________________________________________________________________  Electronically signed on 10/25/2024 at 2:49:16 PM by Peter Solorzano M.D.         *** Final ***

## 2024-10-28 NOTE — CONSULT NOTE ADULT - NS ATTEND AMEND GEN_ALL_CORE FT
Outpatient monitor, if no AF on monitor then can consider outpatient ILR.
Patient care and plan discussed and reviewed with Advanced Care Provider. Plan as outlined above edited by me to reflect our discussion.

## 2024-10-28 NOTE — CONSULT NOTE ADULT - ASSESSMENT
47 y/o female with PMH of DM, HTN, p/w slurred speech, left hand weakness, loss of balance with falling to the left, and choking on liquids 2 days prior to presentation. Symptoms have gradually improved and now only complains of mild slurred speech, difficulty swallowing, and left finger clumsiness. Also noticed some difficulty with reading, skips over some words when reading fast. No gait disturbance. Has not seen a PCP or gone for medical check up in a long time. Patient denies history of stroke or similar symptoms. Patient is a current smoker for 20 years, 5-6 cigarettes a day. CTH findings consistent with an acute right basal ganglia infarct and age indeterminant left lacunar infarct. Neuro on board and patient started on DAPT. EP consulted for potential cardiac monitor ILR vs external.    Echo: EF 81%. No WMAs. No evidence of intracardiac shunt    Cryptogenic stroke  - Telemetry monitor showing SR  - Continue DAPT  - Awaiting heme workup currently  - Recommend Zio XT x 4 weeks upon discharge. Call ext [phone]  - Follow up in EP clinic in four weeks to follow up results and for consideration of ILR implant on an outpatient basis    Plan discussed with Dr. Camp 47 y/o female with PMH of DM, HTN, p/w slurred speech, left hand weakness, loss of balance with falling to the left, and choking on liquids 2 days prior to presentation. Symptoms have gradually improved and now only complains of mild slurred speech, difficulty swallowing, and left finger clumsiness. Also noticed some difficulty with reading, skips over some words when reading fast. No gait disturbance. Has not seen a PCP or gone for medical check up in a long time. Patient denies history of stroke or similar symptoms. Patient is a current smoker for 20 years, 5-6 cigarettes a day. CTH findings consistent with an acute right basal ganglia infarct and age indeterminant left lacunar infarct. Neuro on board and patient started on DAPT. EP consulted for potential cardiac monitor ILR vs external.    Echo: EF 81%. No WMAs. No evidence of intracardiac shunt    Cryptogenic stroke  - Telemetry monitor showing SR  - Continue DAPT  - Awaiting heme workup currently  - Recommend Zio XT x 4 weeks upon discharge. Call ext [phone]  - Follow up in EP clinic in four weeks to follow up results and for consideration of ILR implant on an outpatient basis    No EP intervention indicated at this time. Will sign off  Plan discussed with Dr. Camp

## 2024-10-28 NOTE — PROGRESS NOTE ADULT - PROBLEM SELECTOR PROBLEM 1
CVA (cerebrovascular accident)
Uncontrolled type 2 diabetes mellitus with hyperglycemia
CVA (cerebrovascular accident)
Uncontrolled type 2 diabetes mellitus with hyperglycemia
CVA (cerebrovascular accident)
CVA (cerebrovascular accident)

## 2024-10-28 NOTE — PROGRESS NOTE ADULT - SUBJECTIVE AND OBJECTIVE BOX
DIABETES FOLLOW UP NOTE: Saw pt earlier today    Chief Complaint: Endocrine consult requested for management of DM    INTERVAL HX: Pt stable, reports tolerating POs with BG levels still 200s most of the time while on present insulin doses. No hypoglycemia. Pt denies any HA, weakness in any extremities. Even though pt's  has DM, pt has limited knowledge about DM self care. Pt was aware of having Pre DM few years ago but didn't follow any diet or medical care since.       Review of Systems:  General: As above  Cardiovascular: No chest pain, palpitations  Respiratory: No SOB, no cough  GI: No nausea, vomiting, abdominal pain  Endocrine: No polyuria, polydipsia or S&Sx of hypoglycemia    Allergies    No Known Allergies    Intolerances      MEDICATIONS:  atorvastatin 80 milliGRAM(s) Oral at bedtime  insulin glargine Injectable (LANTUS) 26 Unit(s) SubCutaneous at bedtime  insulin lispro (ADMELOG) corrective regimen sliding scale   SubCutaneous <User Schedule>  insulin lispro (ADMELOG) corrective regimen sliding scale   SubCutaneous three times a day before meals  insulin lispro Injectable (ADMELOG) 13 Unit(s) SubCutaneous three times a day before meals  nicotine -  14 mG/24Hr(s) Patch 1 Patch Transdermal daily        PHYSICAL EXAM:  VITALS: T(C): 36.9 (10-28-24 @ 11:34)  T(F): 98.4 (10-28-24 @ 11:34), Max: 98.6 (10-27-24 @ 20:52)  HR: 84 (10-28-24 @ 11:34) (68 - 87)  BP: 158/87 (10-28-24 @ 11:34) (143/85 - 165/91)  RR:  (18 - 18)  SpO2:  (96% - 98%)  Wt(kg): --  GENERAL: Female laying in bed  in NAD  Abdomen: Soft, nontender, non distended, + obesity  Extremities: Warm, no edema in all 4 exts  NEURO: A&O X3. Equal strength in all 4 extremities    LABS:  POCT Blood Glucose.: 180 mg/dL (10-28-24 @ 13:08)  POCT Blood Glucose.: 159 mg/dL (10-28-24 @ 11:44)  POCT Blood Glucose.: 206 mg/dL (10-28-24 @ 08:18)  POCT Blood Glucose.: 196 mg/dL (10-28-24 @ 03:25)  POCT Blood Glucose.: 223 mg/dL (10-27-24 @ 21:30)  POCT Blood Glucose.: 222 mg/dL (10-27-24 @ 16:40)  POCT Blood Glucose.: 216 mg/dL (10-27-24 @ 11:51)  POCT Blood Glucose.: 225 mg/dL (10-27-24 @ 07:51)  POCT Blood Glucose.: 263 mg/dL (10-27-24 @ 01:55)  POCT Blood Glucose.: 207 mg/dL (10-26-24 @ 22:11)  POCT Blood Glucose.: 288 mg/dL (10-26-24 @ 16:29)  POCT Blood Glucose.: 244 mg/dL (10-26-24 @ 12:03)  POCT Blood Glucose.: 238 mg/dL (10-26-24 @ 08:08)  POCT Blood Glucose.: 274 mg/dL (10-25-24 @ 22:24)  POCT Blood Glucose.: 275 mg/dL (10-25-24 @ 17:00)          10-26    135  |  99  |  18  ----------------------------<  255[H]  3.6   |  25  |  0.74    eGFR: 100    Ca    9.1      10-26    TPro  6.2  /  Alb  3.5  /  TBili  0.6  /  DBili  x   /  AST  10  /  ALT  14  /  AlkPhos  50  10-26      A1C with Estimated Average Glucose Result: 11.7 % (10-25-24 @ 07:13)  A1C with Estimated Average Glucose Result: 11.6 % (10-24-24 @ 13:33)      Estimated Average Glucose: 289 mg/dL (10-25-24 @ 07:13)  Estimated Average Glucose: 286 mg/dL (10-24-24 @ 13:33)        10-25 Chol 220[H] Direct LDL -- LDL calculated 162[H] HDL 36[L] Trig 125

## 2024-10-28 NOTE — PROGRESS NOTE ADULT - PROBLEM SELECTOR PLAN 4
- Diet and lifestyle modification encouraged
Patient would benefit from a GLP-1 for weight management plus diabetes management.     Please start a GLP1 weekly whichever covered by insurance (Ozempic 0.25mg subq once weekly OR Trulicity 0.75mg subq once weekly OR Mounjaro 2.5mg subq once weekly) to be titrated up outpatient as tolerated.
- Diet and lifestyle modification encouraged
BMI 37.7  Please write Rx for Mounjaro 2.5mg  q week (pt reports her insurance covers this med)  Consider referral to weight control clinic at [phone]. [address] [facility] [address]  Reviewed with pt dosing and side effects. No h/o pancreatitis or any thyroid cancer.
- Diet and lifestyle modification encouraged
- Diet and lifestyle modification encouraged

## 2024-10-28 NOTE — CONSULT NOTE ADULT - REASON FOR ADMISSION
Slurred speech, imbalance

## 2024-10-28 NOTE — CONSULT NOTE ADULT - SUBJECTIVE AND OBJECTIVE BOX
Patient is a 48y old  Female who presents with a chief complaint of Slurred speech, imbalance (28 Oct 2024 14:11)      HPI:  48y F pmh prediabetes and HTN not on medications p/w slurred speech, left hand weakness, loss of balance with falling to the left, and choking on liquids since waking up 2 days ago. Symptoms have gradually improved and now only complains of mild slurred speech, difficulty swallowing, and left finger clumsiness. Also noticed some difficulty with reading, skips over some words when reading fast. No gait disturbance. Has not seen a PCP or gone for medical check up in a long time. Patient denies history of stroke or similar symptoms. Patient is a current smoker for 20 years, 5-6 cigarettes a day.     ROS: Denies HA, CP, SOB, palpitation, N/V/D, fever, cough, chills, dizziness, numbness, tingling, weakness  A 10-system ROS was performed and is negative except as noted above and/or in the HPI.    ED: Eval'd by neuro. Labs and imaging done. EKG NSR, CTH -> right basal ganglia infarct, age indeterminant left lacunar infarct. Given  IVF, Plavix, Insulin for hyperglycemia. Tele   (24 Oct 2024 20:16)    still with slurred speech though improved; slight tingling in left fingers; strength intact; no dizziness, tolerating diet    last Mammogram/Gyn exam a few years ago; no hx of VTE; no family hx of VTE      PAST MEDICAL & SURGICAL HISTORY:  HTN (hypertension)          SOCIAL HISTORY:  Smoking - +Tob   Alcohol - Social  Drugs - No drug use  lives with     FAMILY HISTORY:  F- CVA  M- DM    MEDICATIONS  (STANDING):  aspirin enteric coated 81 milliGRAM(s) Oral daily  atorvastatin 80 milliGRAM(s) Oral at bedtime  clopidogrel Tablet 75 milliGRAM(s) Oral daily  dextrose 5%. 1000 milliLiter(s) (100 mL/Hr) IV Continuous <Continuous>  dextrose 5%. 1000 milliLiter(s) (50 mL/Hr) IV Continuous <Continuous>  dextrose 50% Injectable 25 Gram(s) IV Push once  dextrose 50% Injectable 25 Gram(s) IV Push once  dextrose 50% Injectable 12.5 Gram(s) IV Push once  enoxaparin Injectable 40 milliGRAM(s) SubCutaneous every 24 hours  glucagon  Injectable 1 milliGRAM(s) IntraMuscular once  influenza   Vaccine 0.5 milliLiter(s) IntraMuscular once  insulin glargine Injectable (LANTUS) 26 Unit(s) SubCutaneous at bedtime  insulin lispro (ADMELOG) corrective regimen sliding scale   SubCutaneous three times a day before meals  insulin lispro (ADMELOG) corrective regimen sliding scale   SubCutaneous <User Schedule>  insulin lispro Injectable (ADMELOG) 13 Unit(s) SubCutaneous three times a day before meals  labetalol 100 milliGRAM(s) Oral three times a day  lisinopril 10 milliGRAM(s) Oral daily  nicotine -  14 mG/24Hr(s) Patch 1 Patch Transdermal daily  senna 2 Tablet(s) Oral at bedtime    MEDICATIONS  (PRN):  acetaminophen     Tablet .. 650 milliGRAM(s) Oral every 6 hours PRN Temp greater or equal to 38C (100.4F), Mild Pain (1 - 3)  aluminum hydroxide/magnesium hydroxide/simethicone Suspension 30 milliLiter(s) Oral every 4 hours PRN Dyspepsia  dextrose Oral Gel 15 Gram(s) Oral once PRN Blood Glucose LESS THAN 70 milliGRAM(s)/deciliter  melatonin 3 milliGRAM(s) Oral at bedtime PRN Insomnia  ondansetron Injectable 4 milliGRAM(s) IV Push every 8 hours PRN Nausea and/or Vomiting  polyethylene glycol 3350 17 Gram(s) Oral daily PRN Constipation      Allergies    No Known Allergies    Intolerances    ROS  gen- no f/c,   heent- no difficulty swallowing, no bleeding  cv- no chest pain, no palpitation  resp- no dyspnea, no cough  gi- no n/v/abd pain, no melena/brbpr  gu- no hematuria  musculosk- no back pain  skin- no rash  neuro- no dizziness, +slurred speech  ROS otherwise reviewed and negative        Vital Signs Last 24 Hrs  T(C): 37.2 (28 Oct 2024 14:27), Max: 37.2 (28 Oct 2024 14:27)  T(F): 98.9 (28 Oct 2024 14:27), Max: 98.9 (28 Oct 2024 14:27)  HR: 83 (28 Oct 2024 14:27) (68 - 85)  BP: 168/83 (28 Oct 2024 14:27) (143/85 - 168/83)  BP(mean): --  RR: 18 (28 Oct 2024 14:27) (18 - 18)  SpO2: 96% (28 Oct 2024 14:27) (96% - 98%)    Parameters below as of 28 Oct 2024 14:27  Patient On (Oxygen Delivery Method): room air        PHYSICAL EXAM  General: adult in NAD  HEENT: clear oropharynx, anicteric sclera, pink conjunctiva  Neck: supple  CV: normal S1/S2   Lungs: clear to auscultation, no wheezes, no rales  Abdomen: soft non-tender non-distended, positive bowel sounds  Ext: no calf tenderness, no edema  Skin: no rashes and no petechiae  Lymph Nodes: No LAD in neck  Neuro: alert and oriented X 3    LABS:            Serial CBC's  10-25 @ 07:13  Hct-42.1 / Hgb-13.4 / Plat-227 / RBC-5.14 / WBC-8.23              PTT - ( 28 Oct 2024 06:03 )  PTT:30.9 sec                  Radiology:  < from: CT Chest w/ IV Cont (10.26.24 @ 21:33) >  IMPRESSION:  No evidence of malignancy within the chest, abdomen or pelvis.    Please refer to detailed findings otherwise described above.      < from: VA Duplex Lower Ext Vein Scan, Bilat (10.25.24 @ 21:46) >    IMPRESSION:    No evidence for acute DVT in the bilateral lower extremity deep venous   systems.      < from: MR Head No Cont (10.25.24 @ 00:18) >  IMPRESSION:    1.  Right basal ganglia infarct, 24 hours to 7 days in age.  2.  Chronic infarct in the right corona radiata and mild small vessel   disease.

## 2024-10-28 NOTE — PROGRESS NOTE ADULT - PROBLEM SELECTOR PLAN 5
- DVT ppx: Lovenox SQ   - Diet: DASH/ CC  - PT / OT consult
On Nicotine Patch   Consider referral to center for Tobacco Control program at  [phone]
- DVT ppx: Lovenox SQ   - Diet: DASH/ CC  - PT / OT consult

## 2024-10-28 NOTE — CONSULT NOTE ADULT - ASSESSMENT
48F with HTN, pre diabetes with CVA    #CVA  - likely small vessel disease per Neuro  - will check APLS panel as there is association with arterial thrombosis  - no prior hx of VTE or Family hx of VTE; remainder of hypercoaguable conditions are more associated with venous thromboembolism but will check for completeness  - no evidence of malignancy on Ct c/a/p  - Cardiology following, for ILR vs monitor  - antiplatelets per Neurology    will need to FU as outpatient to FU results     d/w NP 48F with HTN, pre diabetes with CVA    #CVA  - likely small vessel disease per Neuro  - will check APLS panel as there is association with arterial thrombosis; no rheumatological illnesses, 1 prior miscarriage  - no prior hx of VTE or Family hx of VTE; remainder of hypercoaguable conditions are more associated with venous thromboembolism but will check for completeness  - no evidence of malignancy on Ct c/a/p  - Cardiology following, for ILR vs monitor  - antiplatelets per Neurology    will need to FU as outpatient to FU results     d/w NP

## 2024-10-28 NOTE — CONSULT NOTE ADULT - SUBJECTIVE AND OBJECTIVE BOX
DATE OF SERVICE: 10-28-24 @ 14:12    CHIEF COMPLAINT:Patient is a 48y old  Female who presents with a chief complaint of Slurred speech, imbalance (27 Oct 2024 23:25)      HISTORY OF PRESENT ILLNESS:HPI:  48y F pmh prediabetes and HTN not on medications p/w slurred speech, left hand weakness, loss of balance with falling to the left, and choking on liquids since waking up 2 days ago. Symptoms have gradually improved and now only complains of mild slurred speech, difficulty swallowing, and left finger clumsiness. Also noticed some difficulty with reading, skips over some words when reading fast. No gait disturbance. Has not seen a PCP or gone for medical check up in a long time. Patient denies history of stroke or similar symptoms. Patient is a current smoker for 20 years, 5-6 cigarettes a day.     ROS: Denies HA, CP, SOB, palpitation, N/V/D, fever, cough, chills, dizziness, numbness, tingling, weakness  A 10-system ROS was performed and is negative except as noted above and/or in the HPI.    ED: Eval'd by neuro. Labs and imaging done. EKG NSR, CTH -> right basal ganglia infarct, age indeterminant left lacunar infarct. Given  IVF, Plavix, Insulin for hyperglycemia. Tele   (24 Oct 2024 20:16)      PAST MEDICAL & SURGICAL HISTORY:  HTN (hypertension)              MEDICATIONS:  aspirin enteric coated 81 milliGRAM(s) Oral daily  clopidogrel Tablet 75 milliGRAM(s) Oral daily  enoxaparin Injectable 40 milliGRAM(s) SubCutaneous every 24 hours  labetalol 100 milliGRAM(s) Oral three times a day  lisinopril 10 milliGRAM(s) Oral daily        acetaminophen     Tablet .. 650 milliGRAM(s) Oral every 6 hours PRN  melatonin 3 milliGRAM(s) Oral at bedtime PRN  ondansetron Injectable 4 milliGRAM(s) IV Push every 8 hours PRN    aluminum hydroxide/magnesium hydroxide/simethicone Suspension 30 milliLiter(s) Oral every 4 hours PRN  polyethylene glycol 3350 17 Gram(s) Oral daily PRN  senna 2 Tablet(s) Oral at bedtime    atorvastatin 80 milliGRAM(s) Oral at bedtime  dextrose 50% Injectable 25 Gram(s) IV Push once  dextrose 50% Injectable 12.5 Gram(s) IV Push once  dextrose 50% Injectable 25 Gram(s) IV Push once  dextrose Oral Gel 15 Gram(s) Oral once PRN  glucagon  Injectable 1 milliGRAM(s) IntraMuscular once  insulin glargine Injectable (LANTUS) 26 Unit(s) SubCutaneous at bedtime  insulin lispro (ADMELOG) corrective regimen sliding scale   SubCutaneous three times a day before meals  insulin lispro (ADMELOG) corrective regimen sliding scale   SubCutaneous <User Schedule>  insulin lispro Injectable (ADMELOG) 13 Unit(s) SubCutaneous three times a day before meals    dextrose 5%. 1000 milliLiter(s) IV Continuous <Continuous>  dextrose 5%. 1000 milliLiter(s) IV Continuous <Continuous>  influenza   Vaccine 0.5 milliLiter(s) IntraMuscular once      FAMILY HISTORY:      Non-contributory    SOCIAL HISTORY:    [+] Tobacco      Allergies    No Known Allergies    Intolerances    	    REVIEW OF SYSTEMS:  CONSTITUTIONAL: No fever  EYES: No eye pain, visual disturbances, or discharge  ENMT:  No difficulty hearing, tinnitus  NECK: No pain or stiffness  RESPIRATORY: No cough, wheezing,  CARDIOVASCULAR: No chest pain, palpitations, passing out, dizziness, or leg swelling  GASTROINTESTINAL:  No nausea, vomiting, diarrhea or constipation. No melena.  GENITOURINARY: No dysuria, hematuria  NEUROLOGICAL: + cva  SKIN: No burning or lesions   ENDOCRINE: No heat or cold intolerance  MUSCULOSKELETAL: No joint pain or swelling  PSYCHIATRIC: No  anxiety, mood swings  HEME/LYMPH: No bleeding gums  ALLERGY AND IMMUNOLOGIC: No hives or eczema	    All other ROS negative    PHYSICAL EXAM:  T(C): 36.9 (10-28-24 @ 11:34), Max: 37 (10-27-24 @ 20:52)  HR: 84 (10-28-24 @ 11:34) (68 - 87)  BP: 158/87 (10-28-24 @ 11:34) (143/85 - 165/91)  RR: 18 (10-28-24 @ 11:34) (18 - 18)  SpO2: 96% (10-28-24 @ 11:34) (96% - 98%)  Wt(kg): --  I&O's Summary      Appearance: Normal	  HEENT:   Normal oral mucosa, EOMI	  Cardiovascular:  S1 S2, No JVD,    Respiratory: Lungs clear to auscultation	  Psychiatry: Alert  Gastrointestinal:  Soft, Non-tender, + BS	  Skin: No rashes   Neurologic: Non-focal  Extremities:  No edema  Vascular: Peripheral pulses palpable    	    	  	  CARDIAC MARKERS:  Labs personally reviewed by me            EKG: Personally reviewed by me - sinus tachycardia  Radiology: Personally reviewed by me -   < from: CT Head No Cont (10.24.24 @ 13:01) >  IMPRESSION:        1.   Right carotid system:  No hemodynamically significant stenosis.        2.   Left carotid system:  No hemodynamically significant stenosis.        3.   Intracranial circulation:  No significant vascular lesion.        4.   Brain:  acute/subacute 1.8 cm infarction within the RIGHT basal   ganglia.  No associated hemorrhage seen.  Age indeterminate 5 mm lacunar   infarction in the LEFT kisha.    < from: TTE W or WO Ultrasound Enhancing Agent (10.25.24 @ 10:01) >  CONCLUSIONS:      1. Left ventricular cavity is normal in size. Left ventricular wall thickness is normal. Left ventricular systolic function is hyperdynamic with an ejection fraction of 81 % by Grey's method of disks. There are no regional wall motion abnormalities seen.   2. Normal left ventricular diastolic function, with normal left ventricular filling pressure.   3. Normal right ventricular cavity size, with normal wall thickness, and normal right ventricular systolic function.   4. Normal left and right atrial size.   5. No significant valvular disease.   6. No pericardial effusion seen.   7. Agitated saline injection was negative forintracardiac shunt.   8. No prior echocardiogram is available for comparison.   9. There is increased LV mass and concentric hypertrophy.  10. There is no evidence of a left ventricular thrombus.            Assessment /Plan:     48y F pmh prediabetes and HTN not on medications p/w slurred speech, left hand weakness, loss of balance with falling to the left, and choking on liquids since waking up 2 days ago. Symptoms have gradually improved and now only complains of mild slurred speech, difficulty swallowing, and left finger clumsiness. Also noticed some difficulty with reading, skips over some words when reading fast. No gait disturbance. Has not seen a PCP or gone for medical check up in a long time. Patient denies history of stroke or similar symptoms. Patient is a current smoker for 20 years, 5-6 cigarettes a day.     1. Acute CVA  - CT Head: acute/subacute 1.8 cm infarction within the RIGHT basal   ganglia.  No associated hemorrhage seen.  Age indeterminate 5 mm lacunar   infarction in the LEFT kisha  - s/p Plavix load 300mg  - per Neuro recs ~ ASA 81 mg and plavix 75mg QD for 21 days then continue with aspirin only  - c/w Atorvastatin 80mg qd titrate to LDL <70  - c/w sbq lovenox  - EP called for ilr vs monitor     2. HTN  - bp systolic 140s-160s, diastolic 80s-90s  - c/w labetolol 100 TID  - c/w lisionpril 10mg qd    3. DM2  - c/w sliding scale and standing insulin per Endo recs    4. Smoking cessation  - lifestyle modification and smoking cessation encouraged          Differential diagnosis and plan of care discussed with patient after the evaluation. Counseling on diet, nutritional counseling, weight management, exercise and medication compliance was done.   Advanced care planning/advanced directives discussed with patient/family. DNR status including forceful chest compressions to attempt to restart the heart, ventilator support/artificial breathing, electric shock, artificial nutrition, health care proxy, Molst form all discussed with pt. Pt wishes to consider. More than fifteen minutes spent on discussing advanced directives.     Iolani Behrbom, AG-ZEE Kelly DO [facility]  Cardiovascular Medicine  [address] [place] [address] Dr, [address]  Available for call or text via [employer] TE[employer]  Office [phone]   DATE OF SERVICE: 10-28-24 @ 14:12    CHIEF COMPLAINT:Patient is a 48y old  Female who presents with a chief complaint of Slurred speech, imbalance (27 Oct 2024 23:25)      HISTORY OF PRESENT ILLNESS:HPI:  48y F pmh prediabetes and HTN not on medications p/w slurred speech, left hand weakness, loss of balance with falling to the left, and choking on liquids since waking up 2 days ago. Symptoms have gradually improved and now only complains of mild slurred speech, difficulty swallowing, and left finger clumsiness. Also noticed some difficulty with reading, skips over some words when reading fast. No gait disturbance. Has not seen a PCP or gone for medical check up in a long time. Patient denies history of stroke or similar symptoms. Patient is a current smoker for 20 years, 5-6 cigarettes a day.     ROS: Denies HA, CP, SOB, palpitation, N/V/D, fever, cough, chills, dizziness, numbness, tingling, weakness  A 10-system ROS was performed and is negative except as noted above and/or in the HPI.    ED: Eval'd by neuro. Labs and imaging done. EKG NSR, CTH -> right basal ganglia infarct, age indeterminant left lacunar infarct. Given  IVF, Plavix, Insulin for hyperglycemia. Tele   (24 Oct 2024 20:16)      PAST MEDICAL & SURGICAL HISTORY:  HTN (hypertension)              MEDICATIONS:  aspirin enteric coated 81 milliGRAM(s) Oral daily  clopidogrel Tablet 75 milliGRAM(s) Oral daily  enoxaparin Injectable 40 milliGRAM(s) SubCutaneous every 24 hours  labetalol 100 milliGRAM(s) Oral three times a day  lisinopril 10 milliGRAM(s) Oral daily        acetaminophen     Tablet .. 650 milliGRAM(s) Oral every 6 hours PRN  melatonin 3 milliGRAM(s) Oral at bedtime PRN  ondansetron Injectable 4 milliGRAM(s) IV Push every 8 hours PRN    aluminum hydroxide/magnesium hydroxide/simethicone Suspension 30 milliLiter(s) Oral every 4 hours PRN  polyethylene glycol 3350 17 Gram(s) Oral daily PRN  senna 2 Tablet(s) Oral at bedtime    atorvastatin 80 milliGRAM(s) Oral at bedtime  dextrose 50% Injectable 25 Gram(s) IV Push once  dextrose 50% Injectable 12.5 Gram(s) IV Push once  dextrose 50% Injectable 25 Gram(s) IV Push once  dextrose Oral Gel 15 Gram(s) Oral once PRN  glucagon  Injectable 1 milliGRAM(s) IntraMuscular once  insulin glargine Injectable (LANTUS) 26 Unit(s) SubCutaneous at bedtime  insulin lispro (ADMELOG) corrective regimen sliding scale   SubCutaneous three times a day before meals  insulin lispro (ADMELOG) corrective regimen sliding scale   SubCutaneous <User Schedule>  insulin lispro Injectable (ADMELOG) 13 Unit(s) SubCutaneous three times a day before meals    dextrose 5%. 1000 milliLiter(s) IV Continuous <Continuous>  dextrose 5%. 1000 milliLiter(s) IV Continuous <Continuous>  influenza   Vaccine 0.5 milliLiter(s) IntraMuscular once      FAMILY HISTORY:      Non-contributory    SOCIAL HISTORY:    [+] Tobacco      Allergies    No Known Allergies    Intolerances    	    REVIEW OF SYSTEMS:  CONSTITUTIONAL: No fever  EYES: No eye pain, visual disturbances, or discharge  ENMT:  No difficulty hearing, tinnitus  NECK: No pain or stiffness  RESPIRATORY: No cough, wheezing,  CARDIOVASCULAR: No chest pain, palpitations, passing out, dizziness, or leg swelling  GASTROINTESTINAL:  No nausea, vomiting, diarrhea or constipation. No melena.  GENITOURINARY: No dysuria, hematuria  NEUROLOGICAL: + cva  SKIN: No burning or lesions   ENDOCRINE: No heat or cold intolerance  MUSCULOSKELETAL: No joint pain or swelling  PSYCHIATRIC: No  anxiety, mood swings  HEME/LYMPH: No bleeding gums  ALLERGY AND IMMUNOLOGIC: No hives or eczema	    All other ROS negative    PHYSICAL EXAM:  T(C): 36.9 (10-28-24 @ 11:34), Max: 37 (10-27-24 @ 20:52)  HR: 84 (10-28-24 @ 11:34) (68 - 87)  BP: 158/87 (10-28-24 @ 11:34) (143/85 - 165/91)  RR: 18 (10-28-24 @ 11:34) (18 - 18)  SpO2: 96% (10-28-24 @ 11:34) (96% - 98%)  Wt(kg): --  I&O's Summary      Appearance: Normal	  HEENT:   Normal oral mucosa, EOMI	  Cardiovascular:  S1 S2, No JVD,    Respiratory: Lungs clear to auscultation	  Psychiatry: Alert  Gastrointestinal:  Soft, Non-tender, + BS	  Skin: No rashes   Neurologic: Non-focal  Extremities:  No edema  Vascular: Peripheral pulses palpable    	    	  	  CARDIAC MARKERS:  Labs personally reviewed by me            EKG: Personally reviewed by me - sinus tachycardia  Radiology: Personally reviewed by me -   < from: CT Head No Cont (10.24.24 @ 13:01) >  IMPRESSION:        1.   Right carotid system:  No hemodynamically significant stenosis.        2.   Left carotid system:  No hemodynamically significant stenosis.        3.   Intracranial circulation:  No significant vascular lesion.        4.   Brain:  acute/subacute 1.8 cm infarction within the RIGHT basal   ganglia.  No associated hemorrhage seen.  Age indeterminate 5 mm lacunar   infarction in the LEFT kisha.    < from: TTE W or WO Ultrasound Enhancing Agent (10.25.24 @ 10:01) >  CONCLUSIONS:      1. Left ventricular cavity is normal in size. Left ventricular wall thickness is normal. Left ventricular systolic function is hyperdynamic with an ejection fraction of 81 % by Grey's method of disks. There are no regional wall motion abnormalities seen.   2. Normal left ventricular diastolic function, with normal left ventricular filling pressure.   3. Normal right ventricular cavity size, with normal wall thickness, and normal right ventricular systolic function.   4. Normal left and right atrial size.   5. No significant valvular disease.   6. No pericardial effusion seen.   7. Agitated saline injection was negative forintracardiac shunt.   8. No prior echocardiogram is available for comparison.   9. There is increased LV mass and concentric hypertrophy.  10. There is no evidence of a left ventricular thrombus.            Assessment /Plan:     48y F pmh prediabetes and HTN not on medications p/w slurred speech, left hand weakness, loss of balance with falling to the left, and choking on liquids since waking up 2 days ago. Symptoms have gradually improved and now only complains of mild slurred speech, difficulty swallowing, and left finger clumsiness. Also noticed some difficulty with reading, skips over some words when reading fast. No gait disturbance. Has not seen a PCP or gone for medical check up in a long time. Patient denies history of stroke or similar symptoms. Patient is a current smoker for 20 years, 5-6 cigarettes a day.     1. Acute CVA  - CT Head: acute/subacute 1.8 cm infarction within the RIGHT basal   ganglia.  No associated hemorrhage seen.  Age indeterminate 5 mm lacunar   infarction in the LEFT kisha  - s/p Plavix load 300mg  - per Neuro recs ~ ASA 81 mg and plavix 75mg QD for 21 days then continue with aspirin only  - c/w Atorvastatin 80mg qd titrate to LDL <70  - c/w sbq lovenox  - EP called for ilr vs monitor     2. HTN  - bp systolic 140s-160s, diastolic 80s-90s  - c/w labetolol 100 TID  - recommend increase lisinopril from 10mg qd to 20mg qd if ok with Neurology    3. DM2  - c/w sliding scale and standing insulin per Endo recs    4. Smoking cessation  - lifestyle modification and smoking cessation encouraged          Differential diagnosis and plan of care discussed with patient after the evaluation. Counseling on diet, nutritional counseling, weight management, exercise and medication compliance was done.   Advanced care planning/advanced directives discussed with patient/family. DNR status including forceful chest compressions to attempt to restart the heart, ventilator support/artificial breathing, electric shock, artificial nutrition, health care proxy, Molst form all discussed with pt. Pt wishes to consider. More than fifteen minutes spent on discussing advanced directives.     Iolani Behrbom, MARIKA-NP  Mario Kelly DO [facility]  Cardiovascular Medicine  [address], [address]  Available for call or text via [employer] TE[employer]  Office [phone]   DATE OF SERVICE: 10-28-24 @ 14:12    CHIEF COMPLAINT:Patient is a 48y old  Female who presents with a chief complaint of Slurred speech, imbalance (27 Oct 2024 23:25)      HISTORY OF PRESENT ILLNESS:HPI:  48y F pmh prediabetes and HTN not on medications p/w slurred speech, left hand weakness, loss of balance with falling to the left, and choking on liquids since waking up 2 days ago. Symptoms have gradually improved and now only complains of mild slurred speech, difficulty swallowing, and left finger clumsiness. Also noticed some difficulty with reading, skips over some words when reading fast. No gait disturbance. Has not seen a PCP or gone for medical check up in a long time. Patient denies history of stroke or similar symptoms. Patient is a current smoker for 20 years, 5-6 cigarettes a day.     ROS: Denies HA, CP, SOB, palpitation, N/V/D, fever, cough, chills, dizziness, numbness, tingling, weakness  A 10-system ROS was performed and is negative except as noted above and/or in the HPI.    ED: Eval'd by neuro. Labs and imaging done. EKG NSR, CTH -> right basal ganglia infarct, age indeterminant left lacunar infarct. Given  IVF, Plavix, Insulin for hyperglycemia. Tele   (24 Oct 2024 20:16)      PAST MEDICAL & SURGICAL HISTORY:  HTN (hypertension)              MEDICATIONS:  aspirin enteric coated 81 milliGRAM(s) Oral daily  clopidogrel Tablet 75 milliGRAM(s) Oral daily  enoxaparin Injectable 40 milliGRAM(s) SubCutaneous every 24 hours  labetalol 100 milliGRAM(s) Oral three times a day  lisinopril 10 milliGRAM(s) Oral daily        acetaminophen     Tablet .. 650 milliGRAM(s) Oral every 6 hours PRN  melatonin 3 milliGRAM(s) Oral at bedtime PRN  ondansetron Injectable 4 milliGRAM(s) IV Push every 8 hours PRN    aluminum hydroxide/magnesium hydroxide/simethicone Suspension 30 milliLiter(s) Oral every 4 hours PRN  polyethylene glycol 3350 17 Gram(s) Oral daily PRN  senna 2 Tablet(s) Oral at bedtime    atorvastatin 80 milliGRAM(s) Oral at bedtime  dextrose 50% Injectable 25 Gram(s) IV Push once  dextrose 50% Injectable 12.5 Gram(s) IV Push once  dextrose 50% Injectable 25 Gram(s) IV Push once  dextrose Oral Gel 15 Gram(s) Oral once PRN  glucagon  Injectable 1 milliGRAM(s) IntraMuscular once  insulin glargine Injectable (LANTUS) 26 Unit(s) SubCutaneous at bedtime  insulin lispro (ADMELOG) corrective regimen sliding scale   SubCutaneous three times a day before meals  insulin lispro (ADMELOG) corrective regimen sliding scale   SubCutaneous <User Schedule>  insulin lispro Injectable (ADMELOG) 13 Unit(s) SubCutaneous three times a day before meals    dextrose 5%. 1000 milliLiter(s) IV Continuous <Continuous>  dextrose 5%. 1000 milliLiter(s) IV Continuous <Continuous>  influenza   Vaccine 0.5 milliLiter(s) IntraMuscular once      FAMILY HISTORY:      Non-contributory    SOCIAL HISTORY:    [+] Tobacco      Allergies    No Known Allergies    Intolerances    	    REVIEW OF SYSTEMS:  CONSTITUTIONAL: No fever  EYES: No eye pain, visual disturbances, or discharge  ENMT:  No difficulty hearing, tinnitus  NECK: No pain or stiffness  RESPIRATORY: No cough, wheezing,  CARDIOVASCULAR: No chest pain, palpitations, passing out, dizziness, or leg swelling  GASTROINTESTINAL:  No nausea, vomiting, diarrhea or constipation. No melena.  GENITOURINARY: No dysuria, hematuria  NEUROLOGICAL: + cva  SKIN: No burning or lesions   ENDOCRINE: No heat or cold intolerance  MUSCULOSKELETAL: No joint pain or swelling  PSYCHIATRIC: No  anxiety, mood swings  HEME/LYMPH: No bleeding gums  ALLERGY AND IMMUNOLOGIC: No hives or eczema	    All other ROS negative    PHYSICAL EXAM:  T(C): 36.9 (10-28-24 @ 11:34), Max: 37 (10-27-24 @ 20:52)  HR: 84 (10-28-24 @ 11:34) (68 - 87)  BP: 158/87 (10-28-24 @ 11:34) (143/85 - 165/91)  RR: 18 (10-28-24 @ 11:34) (18 - 18)  SpO2: 96% (10-28-24 @ 11:34) (96% - 98%)  Wt(kg): --  I&O's Summary      Appearance: Normal	  HEENT:   Normal oral mucosa, EOMI	  Cardiovascular:  S1 S2, No JVD,    Respiratory: Lungs clear to auscultation	  Psychiatry: Alert  Gastrointestinal:  Soft, Non-tender, + BS	  Skin: No rashes   Neurologic: Non-focal  Extremities:  No edema  Vascular: Peripheral pulses palpable    	    	  	  CARDIAC MARKERS:  Labs personally reviewed by me            EKG: Personally reviewed by me - sinus tachycardia  Radiology: Personally reviewed by me -   < from: CT Head No Cont (10.24.24 @ 13:01) >  IMPRESSION:        1.   Right carotid system:  No hemodynamically significant stenosis.        2.   Left carotid system:  No hemodynamically significant stenosis.        3.   Intracranial circulation:  No significant vascular lesion.        4.   Brain:  acute/subacute 1.8 cm infarction within the RIGHT basal   ganglia.  No associated hemorrhage seen.  Age indeterminate 5 mm lacunar   infarction in the LEFT kisha.    < from: TTE W or WO Ultrasound Enhancing Agent (10.25.24 @ 10:01) >  CONCLUSIONS:      1. Left ventricular cavity is normal in size. Left ventricular wall thickness is normal. Left ventricular systolic function is hyperdynamic with an ejection fraction of 81 % by Grey's method of disks. There are no regional wall motion abnormalities seen.   2. Normal left ventricular diastolic function, with normal left ventricular filling pressure.   3. Normal right ventricular cavity size, with normal wall thickness, and normal right ventricular systolic function.   4. Normal left and right atrial size.   5. No significant valvular disease.   6. No pericardial effusion seen.   7. Agitated saline injection was negative forintracardiac shunt.   8. No prior echocardiogram is available for comparison.   9. There is increased LV mass and concentric hypertrophy.  10. There is no evidence of a left ventricular thrombus.            Assessment /Plan:     48y F pmh prediabetes and HTN not on medications p/w slurred speech, left hand weakness, loss of balance with falling to the left, and choking on liquids since waking up 2 days ago. Symptoms have gradually improved and now only complains of mild slurred speech, difficulty swallowing, and left finger clumsiness. Also noticed some difficulty with reading, skips over some words when reading fast. No gait disturbance. Has not seen a PCP or gone for medical check up in a long time. Patient denies history of stroke or similar symptoms. Patient is a current smoker for 20 years, 5-6 cigarettes a day.     1. Acute CVA  - CT Head: acute/subacute 1.8 cm infarction within the RIGHT basal ganglia.  No associated hemorrhage seen.  Age indeterminate 5 mm lacunar infarction in the LEFT kihsa  - per Neuro recs ~ ASA 81 mg and plavix 75mg QD for 21 days then continue with aspirin only  - c/w Atorvastatin 80mg qd titrate to LDL <70  - EP called for ilr vs monitor     2. HTN  - bp systolic 140s-160s, diastolic 80s-90s  - c/w labetolol 100 TID  - recommend increase lisinopril from 10mg qd to 20mg qd if ok with Neurology    3. DM2  - c/w sliding scale and standing insulin per Endo recs    4. Smoking cessation  - lifestyle modification and smoking cessation encouraged          Differential diagnosis and plan of care discussed with patient after the evaluation. Counseling on diet, nutritional counseling, weight management, exercise and medication compliance was done.   Advanced care planning/advanced directives discussed with patient/family. DNR status including forceful chest compressions to attempt to restart the heart, ventilator support/artificial breathing, electric shock, artificial nutrition, health care proxy, Molst form all discussed with pt. Pt wishes to consider. More than fifteen minutes spent on discussing advanced directives.           Iolani Behrbom, AG-NP  Mario Kelly DO [facility]  Cardiovascular Medicine  [address] [place] [address] Dr, [address]  Available for call or text via [employer]  Office [phone]

## 2024-10-28 NOTE — PROGRESS NOTE ADULT - PROBLEM SELECTOR PLAN 3
- BP control, goal <130/80    - Start Lisinopril 5mg  qd  - Start HTZ 12.5mg qd  - Titrate BP meds prn
- LDL goal <70  - c/w statin therapy   - check lipid panel as outpatient on a yearly basis
- LDL goal <70  -Pt   - c/w statin therapy as noted above  -C/w low cholesterol diet  - Manage per primary team  - Follow up levels as out pt

## 2024-10-28 NOTE — PROGRESS NOTE ADULT - PROBLEM SELECTOR PROBLEM 4
Morbid obesity
Severe obesity with body mass index (BMI) of 35.0 to 39.9 with comorbidity
Morbid obesity

## 2024-10-28 NOTE — PROGRESS NOTE ADULT - PROBLEM SELECTOR PLAN 2
- hx/o preDM p/w elevated serum glucose > 400. Pt likely w/new dx'd DM2   - Low ISS ACHS  - DASH/CC diet  - F/u A1c
- Goal BP <130/80  - Management as per primary team  - check urine microalbumin level as outpatient
- hx/o preDM p/w elevated serum glucose > 400. Pt likely w/new dx'd DM2   - Low ISS ACHS  - DASH/CC diet  - F/u A1c
- Goal BP <130/80  - Management as per primary team  - check urine microalbumin level as outpatient
- hx/o preDM p/w elevated serum glucose > 400. Pt likely w/new dx'd DM2   - Low ISS ACHS  - DASH/CC diet  - F/u A1c
- hx/o preDM p/w elevated serum glucose > 400. Pt likely w/new dx'd DM2   - Low ISS ACHS  - DASH/CC diet  - F/u A1c

## 2024-10-28 NOTE — PROGRESS NOTE ADULT - PROBLEM/PLAN-4
[facility]ious Disease Progress Note      SUBJECTIVE:  Discussed with RN.Vomited this AM. Pt tearful at bedside. Up in chair. WBC down to 13. O2 requirement on hfnc decreased.    Review of Systems:  Review of Systems   Constitutional: Negative for chills, diaphoresis and fever.   HENT: Negative for congestion, rhinorrhea, sinus pressure and sinus pain.    Respiratory: Negative for cough and shortness of breath.    Cardiovascular: Negative for chest pain.   Gastrointestinal: Positive for vomiting. Negative for abdominal distention, abdominal pain, diarrhea and nausea.   Musculoskeletal: Negative for arthralgias, back pain, myalgias and neck pain.   Skin: Positive for wound. Negative for rash.   Psychiatric/Behavioral: Negative for agitation.       Objective:    Vital signs:  Visit Vitals  /59   Pulse 89   Temp 98.8 °F (37.1 °C) (Oral)   Resp (!) 26   Ht 6' (1.829 m)   Wt 123.2 kg (271 lb 9.7 oz)   SpO2 99%   BMI 36.84 kg/m²     Vent Settings: FiO2 (%):  [70 %-100 %] 70 %  Weight:   Wt Readings from Last 3 Encounters:   05/14/21 123.2 kg (271 lb 9.7 oz)     Weight change:   Height:   Ht Readings from Last 3 Encounters:   03/02/21 6' (1.829 m)      BMI: Body mass index is 36.84 kg/m².    Intake/Output last 3 shifts:  I/O last 3 completed shifts:  In: 4764.6 [P.O.:300; I.V.:2049.8; NG/GT:1915; IV Piggyback:499.8]  Out: 1290 [Urine:1140; Chest Tube:150]  Intake/Output this shift:  I/O this shift:  In: 215 [NG/GT:215]  Out: 0      Physical exam:  Physical Exam  Constitutional:       Appearance: He is obese.   HENT:      Head: Normocephalic and atraumatic.      Mouth/Throat:      Mouth: Mucous membranes are dry.   Eyes:      Pupils: Pupils are equal, round, and reactive to light.   Cardiovascular:      Rate and Rhythm: Normal rate and regular rhythm.   Pulmonary:      Breath sounds: Normal breath sounds.      Comments: Nc    Chest tubes in place  Abdominal:      General: Abdomen is flat. There is no distension.      
DISPLAY PLAN FREE TEXT
Palpations: Abdomen is soft.      Tenderness: There is no abdominal tenderness. There is no guarding.   Musculoskeletal:      Cervical back: Neck supple.   Lymphadenopathy:      Cervical: No cervical adenopathy.   Skin:     General: Skin is warm and dry.      Comments: Ecmo in place       Neurological:      General: No focal deficit present.      Mental Status: He is alert and oriented to person, place, and time.         Current Medications   Current Facility-Administered Medications   Medication Dose Route Frequency Provider Last Rate Last Admin   • potassium CHLORIDE (KLOR-CON) packet 20 mEq  20 mEq Per NG tube Q6H Kurt Dalton MD       • vancomycin (VANCOCIN) 1,000 mg in sodium chloride 0.9 % 250 mL IVPB  1,000 mg Intravenous 2 times per day Kemi Mark .7 mL/hr at 05/30/21 0916 1,000 mg at 05/30/21 0916   • Vancomycin trough level 5/31 at 0830   Does not apply See Admin Instructions Kemi Mark DO       • furosemide (LASIX INJECT) injection 10 mg  10 mg Intravenous TID Xavi Walden MD   10 mg at 05/30/21 0917   • VANCOMYCIN - PHARMACIST MONITORED Misc   Does not apply See Admin Instructions Kemi Mark DO       • ARIPiprazole (ABILIFY) tablet 10 mg  10 mg Per NG tube Nightly David Ortiz MD   10 mg at 05/29/21 2102   • escitalopram (LEXAPRO) tablet 15 mg  15 mg Per NG tube Daily David Ortiz MD   15 mg at 05/30/21 0917   • traZODone (DESYREL) tablet 50 mg  50 mg Per NG tube Nightly David Ortiz MD   50 mg at 05/29/21 2102   • erythromycin (EES) 400 MG/5ML suspension 400 mg  400 mg Per NG tube TID Bin Barros MD   400 mg at 05/30/21 0916   • doxazosin (CARDURA) tablet 2 mg  2 mg Per NG tube Daily Bin Barros MD   2 mg at 05/30/21 1116   • niCARdipine (CARDENE) 40 mg/200 mL in NaCl infusion  0-15 mg/hr Intravenous Continuous Bin Barros MD   Stopped at 05/27/21 0539   • potassium CHLORIDE 40 mEq in sodium chloride 0.9 % 250 mL IVPB  40 mEq Intravenous Q4H PRN Bin Barros MD 
      • esmolol (BREVIBLOC) 2,000 mg/100 mL in sodium chloride 0.9 % infusion  0-200 mcg/kg/min (Dosing Weight) Intravenous Continuous Bin Barros MD 46.1 mL/hr at 05/30/21 0929 125 mcg/kg/min at 05/30/21 0929   • cefTRIAXone (ROCEPHIN) syringe 2,000 mg  2,000 mg Intravenous Q24H Kemi Mark, DO   2,000 mg at 05/29/21 1250   • potassium CHLORIDE 40 mEq in sodium chloride 0.9 % 250 mL IVPB  40 mEq Intravenous Q4H PRN Bin Barros MD   Stopped at 05/28/21 2018   • metoCLOPramide (REGLAN) injection 10 mg  10 mg Intravenous 3 times per day Bin Barros MD   10 mg at 05/30/21 0536   • lidocaine (LIDOCARE) 4 % patch 2 patch  2 patch Transdermal Daily Xavi Walden MD   Stopped at 05/29/21 0838   • cloNIDine (CATAPRES) tablet 0.3 mg  0.3 mg Oral Q6H Bin Barros MD   0.3 mg at 05/30/21 0536   • guaifenesin 100 MG/5ML solution 200 mg  200 mg Oral Q6H Bin Barros MD   200 mg at 05/30/21 0536   • MIDAZolam (VERSED) injection 1 mg  1 mg Intravenous Q1H PRN Bin Barros MD   1 mg at 05/10/21 2300   • sodium chloride (PF) 0.9 % injection 10 mL  10 mL Injection PRN Brooke Kothariid DO   10 mL at 05/30/21 0536   • sodium chloride (PF) 0.9 % injection 10 mL  10 mL Injection 2 times per day Brooke Majid DO   10 mL at 05/30/21 0918   • isosorbide dinitrate (ISORDIL) tablet 40 mg  40 mg Per NG tube Q8H Bin Barros MD   40 mg at 05/30/21 0917   • ALPRAZolam (XANAX) tablet 0.25 mg  0.25 mg Per NG tube Q6H PRN Bin Barros MD   0.25 mg at 05/30/21 0536   • pantoprazole (PROTONIX) 40 MG/20ML (compounded) suspension 40 mg  40 mg Per NG tube Daily Bin Barros MD   40 mg at 05/30/21 0918   • hydrALAZINE (APRESOLINE) tablet 100 mg  100 mg Per G Tube 4 times per day Bin Barros MD   100 mg at 05/30/21 0536   • melatonin tablet 6 mg  6 mg Oral Nightly Bin Barros MD   6 mg at 05/29/21 2101   • dextrose 5 % infusion   Intravenous Continuous PRN Bin Barros MD 5 mL/hr at 05/28/21 2341 New Bag at 
05/28/21 2341   • HYDROcodone-acetaminophen (NORCO) 5-325 MG per tablet 0.5 tablet  0.5 tablet Oral Q4H PRN Xavi Walden MD   0.5 tablet at 05/29/21 0832   • polyethylene glycol (MIRALAX) packet 17 g  17 g Per NG tube Daily Kurt Dalton MD   17 g at 05/30/21 0917   • magnesium sulfate 2 g in 50 mL premix IVPB  2 g Intravenous PRN Kurt Dalton MD 25 mL/hr at 05/28/21 1405 2 g at 05/28/21 1405   • dexMEDETomidine (PRECEDEX) 400 mcg/100 mL in sodium chloride 0.9 % infusion  0-1 mcg/kg/hr (Dosing Weight) Intravenous Continuous PRN Bin Barros MD   Stopped at 04/07/21 1030   • labetalol (NORMODYNE) tablet 600 mg  600 mg Oral Q6H Bin Barros MD   600 mg at 05/30/21 0916   • docusate sodium (COLACE) 50 MG/5ML liquid 100 mg  100 mg Per NG tube BID Bin Barros MD   100 mg at 05/30/21 0917   • [Held by provider] insulin glargine (LANTUS) injection 35 Units  35 Units Subcutaneous 2 times per day Rolanda Trinidad MD   Stopped at 03/28/21 2130   • insulin lispro (ADMELOG,HumaLOG) scheduled AND correction dose   Subcutaneous 4 times per day Rommel Currie MD   Stopped at 04/29/21 0507   • amLODIPine (NORVASC) tablet 10 mg  10 mg Oral Daily Bin Barros MD   10 mg at 05/30/21 1116   • hydrALAZINE (APRESOLINE) injection 10 mg  10 mg Intravenous Q4H PRN Xavi Walden MD   10 mg at 05/25/21 1055   • sennosides (SENOKOT) 8.8 MG/5ML syrup 5 mL  5 mL Per NG tube BID Bin Barros MD   5 mL at 05/30/21 0917   • OXcarbazepine (TRILEPTAL) 300 MG/5ML suspension 600 mg  600 mg Oral 2 times per day Bin Barros MD   600 mg at 05/30/21 0919   • potassium CHLORIDE (KLOR-CON) packet 40 mEq  40 mEq Per NG tube Daily PRN Bin Barros MD   40 mEq at 05/21/21 1406   • hydrALAZINE (APRESOLINE) injection 20 mg  20 mg Intravenous PRN Xavi Walden MD   20 mg at 05/07/21 0404   • acetaminophen (TYLENOL) tablet 650 mg  650 mg Oral Q6H PRN Kat Rangel PA-C   650 mg at 05/28/21 6328   • morphine 
injection 4 mg  4 mg Intravenous Q1H PRN Kat Rangel PA-C   4 mg at 05/29/21 2114   • fentaNYL (SUBLIMAZE) injection 25 mcg  25 mcg Intravenous Q1H PRN Kat Rangel PA-C   25 mcg at 05/27/21 1550   • ondansetron (ZOFRAN ODT) disintegrating tablet 4 mg  4 mg Oral Q12H PRN Kat Rangel PA-C        Or   • ondansetron (ZOFRAN) injection 4 mg  4 mg Intravenous Q6H PRN Kat Rangel PA-C   4 mg at 05/30/21 0950   • AMIODarone 150 mg in dextrose 100 mL bolus IVPB  150 mg Intravenous PRN Kat Rangel PA-C       • AMIODarone (CORDARONE) 450 mg/250 mL dextrose 5% infusion  0.5 mg/min Intravenous Continuous PRN Kat Rangel PA-C       • lidocaine (XYLOCAINE) 2,000 mg/500 mL in dextrose 5 % infusion  2 mg/min Intravenous Continuous PRN Kat Rangel PA-C       • docusate sodium-sennosides (SENOKOT S) 50-8.6 MG 2 tablet  2 tablet Oral BID PRN Kat Rangel PA-C   2 tablet at 05/03/21 0857   • magnesium hydroxide (MILK OF MAGNESIA) 400 MG/5ML suspension 30 mL  30 mL Oral Q12H PRN Kat Rangel PA-C   30 mL at 04/15/21 0405   • sodium chloride 0.9% infusion   Intravenous Continuous Kat Rangel PA-C 3 mL/hr at 03/17/21 0202 New Bag at 03/17/21 0202   • calcium gluconate 1,000 mg in sodium chloride 0.9 % 250 mL IVPB  1,000 mg Intravenous PRN Kat Rangel PA-C       • lidocaine (cardiac) (XYLOCAINE) PF syringe 124 mg  1 mg/kg Intravenous PRN Kat Rangel PA-C       • sodium bicarbonate 8.4 % injection 50 mEq  50 mEq Intravenous PRN Kat Rangel PA-C   50 mEq at 05/25/21 1508   • chlorhexidine gluconate (PERIDEX) 0.12 % solution 15 mL  15 mL Swish & Spit PRN Kat Rangel PA-C   15 mL at 05/25/21 1102   • dextrose 50 % injection 25 g  25 g Intravenous PRN Kat Rangel PA-C   25 g at 04/29/21 0009   • dextrose 50 % injection 12.5 g  12.5 g Intravenous PRN Kat MONTAÑO 
FELIBERTO Rangel       • glucagon (GLUCAGEN) injection 1 mg  1 mg Intramuscular PRN Kat Rangel PA-C       • dextrose (GLUTOSE) 40 % gel 15 g  15 g Oral PRN Kat Rangel PA-C       • dextrose (GLUTOSE) 40 % gel 30 g  30 g Oral PRN Kat Rangel PA-C       • bisacodyl (DULCOLAX) suppository 10 mg  10 mg Rectal Daily PRN Elke Birch MD   10 mg at 04/14/21 0538     Lines:  CVC Triple Lumen 02/21/21 Left Subclavian (Active)   Placement Date/Time: 02/21/21 1244   Inserted By: Kta Kulkarni  Present at Time of Admission: No  Size: (c) Other (comment)  Laterality: Left  Location: Subclavian  Prep: Chlorhexidine with alcohol  Ultrasound Used: Yes  Placement Verificatio...   Number of days: 6       ECMO Arterial Right (Active)   Placement Date: 02/21/21   Inserted By: Dr. Barros  Present at Time of Admission: No  Laterality: Right  Securement: Sutures;Transparent dressing   Number of days: 6       ECMO Venous Right Internal jugular vein (Active)   Placement Date/Time: 02/21/21 1302   Inserted By: Bin Barros  Present at Time of Admission: No  Laterality: Right  Venous Location: Internal jugular vein  Size (fr): (c) Other (comment)  Securement: (c) Sutures;Transparent dressing;Stabilization de...   Number of days: 6       Non-Surgical Airway 02/20/21 8 (Active)   Placement Date: 02/20/21   Inserted By: Other (See comment)  Placed prior to hospital admission: Other care facility  ETT Size (mm): 8   Number of days: 7       Arterial Line 02/21/21 1 Left Radial (Active)   Placement Date/Time: 02/21/21 1235   Inserted By: Kat Kulkarni  Line Number: 1  Laterality: Left  Location: Radial  Local Anesthetic: None  Technique: Ultrasound guided  Ultrasound Used: Yes  Prep: Chlorhexidine with alcohol  Size: 18 G  Atte...   Number of days: 6       GI Tube 02/21/21 Orogastric (Active)   Placement Date/Time: 02/21/21 1300   Tube Type: Orogastric  Placement Verification Method: 
Auscultation   Number of days: 6       GI Tube 02/23/21 Nasogastric Left Nare (Active)   Placement Date/Time: 02/23/21 0200   Inserted By: Sheldon PERRY  Present at Time of Admission: No  Tube Type: Nasogastric  Laterality: Left  Location: Nare  Placement Verification Method: Auscultation;X-ray   Number of days: 4        Results:  Imaging: I have reviewed the lmaging from the last 24 hrs,  Please refer to final report.    Labs:     Recent Labs     05/28/21 0259 05/29/21 0259 05/29/21 1844 05/29/21 2002 05/30/21  0302   SODIUM 146* 141  --  138 136   POTASSIUM 5.0 4.6 4.5 4.8 4.6   CO2 29 27  --  27 27   ANIONGAP 11 10  --  11 10   GLUCOSE 115* 112*  --  104* 107*   BUN 10 13  --  13 12   CREATININE 0.56* 0.52*  --  0.51* 0.52*   CALCIUM 9.2 8.9  --  9.2 8.8   BILIRUBIN 0.6 0.7  --   --  0.9   AST 40* 43*  --   --  44*   GPT 45 44  --   --  50   ALKPT 95 87  --   --  77        Recent Labs     05/29/21 1844 05/29/21 2002 05/30/21  0302   WBC 14.2* 14.3* 13.0*   RBC 3.64* 3.64* 3.54*   HGB 10.4* 10.3* 10.0*   HCT 33.7* 33.6* 32.8*    262 265   MCV 92.6 92.3 92.7   MCH 28.6 28.3 28.2   MCHC 30.9* 30.7* 30.5*   NRBCRE 0 0 0         Microbiology:  Microbiology Results     None        CMV PCR negative 2/28/21, 3/8/21  PCP negative  strongy ab negative  2/26 blood cxs mssa, resp cxs mssa/group b,prevotella, s. mitis  2/28 blood cxs neg  Personally viewed recent CXR: BL extensive opacities    3/6 blood cxs neg  Sputum cx negative growth but with GPC on stain  3/29/21 galactomannan negative      Assessment and Plan:  Darryl Bernal is a 32 year old male with history of MALKA, morbid obesity, bipolar and anxiety who presented as transfer from [facility] with COVID.     Impression:  1. Severe/Critical COVID pneumonia             -Dx 2/18/21 with symptoms starting 4 days prior and IgG positive             - Intubated at OSH 2/20/21 now off steroids              -VV Ecmo 2/21/21 and [facility] ECMO 
3/5/21   -Extubated 4/12/21   -Decannulaed 5/27/21  2. MSSA bacteremia and pneumonia  2/26/21 and Group B strep pneumonia    - Completed treatment  3. Respiratory failure suspect predominantly related to COVID-19   4. Plantar blisters   5. Cardiac tamponade/ Mediastinal hematoma - s/p evacuation 3/12  6. Right hemothorax s/p VATS and hematoma evacuation 5/10/21  7. E.coli bacteremia-possibly from urinary source given purulent secretions after clemons removed  8. Fevers-improved    Recommendations:  - Continue ceftriaxone   -Plan to treat for two weeks from date of first negative blood culture (5/23/21)  - Follow up blood cx  - Continue vancomycin for now. If blood cx remain negative and clinically stable, will likely stop tomorrow  - Will continue to monitor clinically, WBC, temp curve, inflammatory markers     Discussed with VICKY Mark DO  5/22/2021      
DISPLAY PLAN FREE TEXT

## 2024-10-28 NOTE — PROGRESS NOTE ADULT - NSPROGADDITIONALINFOA_GEN_ALL_CORE
Provided face to face education which was more than 50% of encounter that also included assessing pt/labs/meds and discussing plan of care with primary team.  Adjusting insulin  Discharge plan  Follow up care    Contact via [employer] Teams during business hours  To reach covering provider access AMION via [employer] tools  For Urgent matters/after-hours/weekends/holidays please page endocrine fellow on call   For nonurgent matters please email [email or web address]    Please note that this patient may be followed by different provider tomorrow.  Notify endocrine 24 hours prior to discharge for final recommendations
5
-Plan discussed with pt/team.  Contact info: [phone] (24/7). pager [phone]  Amion on [employer]  Teams on M-T-W-F. Unavailable Thu/Weekends/Holidays  Provided face to face education as well as assessed  pt/labs/meds and discussed plan with primary team  Adjusting insulin  Discharge plan  Follow up care
Length To Time In Minutes Device Was In Place: 10

## 2024-10-29 ENCOUNTER — TRANSCRIPTION ENCOUNTER (OUTPATIENT)
Age: 49
End: 2024-10-29

## 2024-10-29 ENCOUNTER — NON-APPOINTMENT (OUTPATIENT)
Age: 49
End: 2024-10-29

## 2024-10-29 VITALS
OXYGEN SATURATION: 98 % | RESPIRATION RATE: 18 BRPM | DIASTOLIC BLOOD PRESSURE: 67 MMHG | TEMPERATURE: 99 F | SYSTOLIC BLOOD PRESSURE: 138 MMHG | HEART RATE: 82 BPM

## 2024-10-29 DIAGNOSIS — I63.9 CEREBRAL INFARCTION, UNSPECIFIED: ICD-10-CM

## 2024-10-29 PROBLEM — I10 ESSENTIAL (PRIMARY) HYPERTENSION: Chronic | Status: ACTIVE | Noted: 2024-10-24

## 2024-10-29 PROBLEM — Z00.00 ENCOUNTER FOR PREVENTIVE HEALTH EXAMINATION: Status: ACTIVE | Noted: 2024-10-29

## 2024-10-29 LAB
ANA TITR SER: NEGATIVE — SIGNIFICANT CHANGE UP
B2 GLYCOPROT1 IGA SER QL: <2 U/ML — SIGNIFICANT CHANGE UP
B2 GLYCOPROT1 IGA SER QL: <2 U/ML — SIGNIFICANT CHANGE UP
B2 GLYCOPROT1 IGG SER-ACNC: <1.4 U/ML — SIGNIFICANT CHANGE UP
B2 GLYCOPROT1 IGG SER-ACNC: <1.4 U/ML — SIGNIFICANT CHANGE UP
B2 GLYCOPROT1 IGM SER-ACNC: 1.5 U/ML — SIGNIFICANT CHANGE UP
B2 GLYCOPROT1 IGM SER-ACNC: 1.6 U/ML — SIGNIFICANT CHANGE UP
CARDIOLIPIN IGM SER-MCNC: 3.3 MPL U/ML — SIGNIFICANT CHANGE UP
CARDIOLIPIN IGM SER-MCNC: 3.6 MPL U/ML — SIGNIFICANT CHANGE UP
CARDIOLIPIN IGM SER-MCNC: <1.6 GPL U/ML — SIGNIFICANT CHANGE UP
CARDIOLIPIN IGM SER-MCNC: <1.6 GPL U/ML — SIGNIFICANT CHANGE UP
DEPRECATED CARDIOLIPIN IGA SER: <2 APL U/ML — SIGNIFICANT CHANGE UP
DEPRECATED CARDIOLIPIN IGA SER: <2 APL U/ML — SIGNIFICANT CHANGE UP
DRVVT RATIO: 0.89 RATIO — SIGNIFICANT CHANGE UP (ref 0–1.21)
DRVVT SCREEN TO CONFIRM RATIO: SIGNIFICANT CHANGE UP
GLUCOSE BLDC GLUCOMTR-MCNC: 161 MG/DL — HIGH (ref 70–99)
GLUCOSE BLDC GLUCOMTR-MCNC: 168 MG/DL — HIGH (ref 70–99)
GLUCOSE BLDC GLUCOMTR-MCNC: 174 MG/DL — HIGH (ref 70–99)
NORMALIZED SCT PPP-RTO: 1.08 RATIO — SIGNIFICANT CHANGE UP (ref 0–1.16)
NORMALIZED SCT PPP-RTO: SIGNIFICANT CHANGE UP

## 2024-10-29 PROCEDURE — 97166 OT EVAL MOD COMPLEX 45 MIN: CPT

## 2024-10-29 PROCEDURE — 82947 ASSAY GLUCOSE BLOOD QUANT: CPT

## 2024-10-29 PROCEDURE — 85014 HEMATOCRIT: CPT

## 2024-10-29 PROCEDURE — 85025 COMPLETE CBC W/AUTO DIFF WBC: CPT

## 2024-10-29 PROCEDURE — 84295 ASSAY OF SERUM SODIUM: CPT

## 2024-10-29 PROCEDURE — 71046 X-RAY EXAM CHEST 2 VIEWS: CPT

## 2024-10-29 PROCEDURE — 87086 URINE CULTURE/COLONY COUNT: CPT

## 2024-10-29 PROCEDURE — 93970 EXTREMITY STUDY: CPT

## 2024-10-29 PROCEDURE — 86147 CARDIOLIPIN ANTIBODY EA IG: CPT

## 2024-10-29 PROCEDURE — 84484 ASSAY OF TROPONIN QUANT: CPT

## 2024-10-29 PROCEDURE — 70551 MRI BRAIN STEM W/O DYE: CPT | Mod: MC

## 2024-10-29 PROCEDURE — 85730 THROMBOPLASTIN TIME PARTIAL: CPT

## 2024-10-29 PROCEDURE — 85613 RUSSELL VIPER VENOM DILUTED: CPT

## 2024-10-29 PROCEDURE — 74177 CT ABD & PELVIS W/CONTRAST: CPT | Mod: MC

## 2024-10-29 PROCEDURE — 81240 F2 GENE: CPT

## 2024-10-29 PROCEDURE — 71260 CT THORAX DX C+: CPT | Mod: MC

## 2024-10-29 PROCEDURE — 85610 PROTHROMBIN TIME: CPT

## 2024-10-29 PROCEDURE — 85027 COMPLETE CBC AUTOMATED: CPT

## 2024-10-29 PROCEDURE — 70498 CT ANGIOGRAPHY NECK: CPT | Mod: MC

## 2024-10-29 PROCEDURE — 81241 F5 GENE: CPT

## 2024-10-29 PROCEDURE — C8929: CPT

## 2024-10-29 PROCEDURE — 86038 ANTINUCLEAR ANTIBODIES: CPT

## 2024-10-29 PROCEDURE — 80307 DRUG TEST PRSMV CHEM ANLYZR: CPT

## 2024-10-29 PROCEDURE — 96375 TX/PRO/DX INJ NEW DRUG ADDON: CPT

## 2024-10-29 PROCEDURE — 92610 EVALUATE SWALLOWING FUNCTION: CPT

## 2024-10-29 PROCEDURE — 74230 X-RAY XM SWLNG FUNCJ C+: CPT

## 2024-10-29 PROCEDURE — 82330 ASSAY OF CALCIUM: CPT

## 2024-10-29 PROCEDURE — 97161 PT EVAL LOW COMPLEX 20 MIN: CPT

## 2024-10-29 PROCEDURE — G0452: CPT | Mod: 26

## 2024-10-29 PROCEDURE — 86146 BETA-2 GLYCOPROTEIN ANTIBODY: CPT

## 2024-10-29 PROCEDURE — 80048 BASIC METABOLIC PNL TOTAL CA: CPT

## 2024-10-29 PROCEDURE — 82803 BLOOD GASES ANY COMBINATION: CPT

## 2024-10-29 PROCEDURE — 85018 HEMOGLOBIN: CPT

## 2024-10-29 PROCEDURE — 82962 GLUCOSE BLOOD TEST: CPT

## 2024-10-29 PROCEDURE — 85306 CLOT INHIBIT PROT S FREE: CPT

## 2024-10-29 PROCEDURE — 92611 MOTION FLUOROSCOPY/SWALLOW: CPT

## 2024-10-29 PROCEDURE — 70496 CT ANGIOGRAPHY HEAD: CPT | Mod: MC

## 2024-10-29 PROCEDURE — 70450 CT HEAD/BRAIN W/O DYE: CPT | Mod: MC

## 2024-10-29 PROCEDURE — 85300 ANTITHROMBIN III ACTIVITY: CPT

## 2024-10-29 PROCEDURE — 96374 THER/PROPH/DIAG INJ IV PUSH: CPT

## 2024-10-29 PROCEDURE — 81001 URINALYSIS AUTO W/SCOPE: CPT

## 2024-10-29 PROCEDURE — 80061 LIPID PANEL: CPT

## 2024-10-29 PROCEDURE — 80053 COMPREHEN METABOLIC PANEL: CPT

## 2024-10-29 PROCEDURE — 83605 ASSAY OF LACTIC ACID: CPT

## 2024-10-29 PROCEDURE — 82435 ASSAY OF BLOOD CHLORIDE: CPT

## 2024-10-29 PROCEDURE — 87637 SARSCOV2&INF A&B&RSV AMP PRB: CPT

## 2024-10-29 PROCEDURE — 84132 ASSAY OF SERUM POTASSIUM: CPT

## 2024-10-29 PROCEDURE — 83036 HEMOGLOBIN GLYCOSYLATED A1C: CPT

## 2024-10-29 PROCEDURE — 93005 ELECTROCARDIOGRAM TRACING: CPT

## 2024-10-29 PROCEDURE — 85303 CLOT INHIBIT PROT C ACTIVITY: CPT

## 2024-10-29 PROCEDURE — 36415 COLL VENOUS BLD VENIPUNCTURE: CPT

## 2024-10-29 PROCEDURE — 99285 EMERGENCY DEPT VISIT HI MDM: CPT | Mod: 25

## 2024-10-29 RX ORDER — LISINOPRIL 40 MG
1 TABLET ORAL
Qty: 30 | Refills: 0
Start: 2024-10-29 | End: 2024-11-27

## 2024-10-29 RX ORDER — BENZOCAINE .06; .7 ML/1; ML/1
0 SWAB TOPICAL
Qty: 100 | Refills: 1
Start: 2024-10-29

## 2024-10-29 RX ORDER — ACETAMINOPHEN 500 MG
2 TABLET ORAL
Refills: 0 | DISCHARGE

## 2024-10-29 RX ORDER — INSULIN GLARGINE,HUM.REC.ANLOG 100/ML
26 VIAL (ML) SUBCUTANEOUS
Qty: 1 | Refills: 0
Start: 2024-10-29 | End: 2024-11-27

## 2024-10-29 RX ORDER — TIRZEPATIDE 5 MG/.5ML
2.5 INJECTION, SOLUTION SUBCUTANEOUS
Qty: 3 | Refills: 0
Start: 2024-10-29 | End: 2024-11-27

## 2024-10-29 RX ORDER — METFORMIN HYDROCHLORIDE 500 MG/1
1 TABLET, EXTENDED RELEASE ORAL
Qty: 60 | Refills: 0
Start: 2024-10-29 | End: 2024-11-27

## 2024-10-29 RX ORDER — LABETALOL HCL 200 MG
1 TABLET ORAL
Qty: 90 | Refills: 0
Start: 2024-10-29 | End: 2024-11-27

## 2024-10-29 RX ORDER — CLOPIDOGREL 75 MG/1
1 TABLET ORAL
Qty: 17 | Refills: 0
Start: 2024-10-29 | End: 2024-11-14

## 2024-10-29 RX ADMIN — Medication 10 MILLIGRAM(S): at 06:11

## 2024-10-29 RX ADMIN — Medication 13 UNIT(S): at 11:57

## 2024-10-29 RX ADMIN — Medication 1: at 11:57

## 2024-10-29 RX ADMIN — NICOTINE POLACRILEX 1 PATCH: 4 GUM, CHEWING ORAL at 07:00

## 2024-10-29 RX ADMIN — Medication 1: at 08:30

## 2024-10-29 RX ADMIN — NICOTINE POLACRILEX 1 PATCH: 4 GUM, CHEWING ORAL at 11:59

## 2024-10-29 RX ADMIN — Medication 81 MILLIGRAM(S): at 11:58

## 2024-10-29 RX ADMIN — Medication 100 MILLIGRAM(S): at 15:13

## 2024-10-29 RX ADMIN — CLOPIDOGREL 75 MILLIGRAM(S): 75 TABLET ORAL at 11:59

## 2024-10-29 RX ADMIN — Medication 100 MILLIGRAM(S): at 06:11

## 2024-10-29 RX ADMIN — Medication 40 MILLIGRAM(S): at 06:11

## 2024-10-29 RX ADMIN — Medication 13 UNIT(S): at 08:30

## 2024-10-29 NOTE — DISCHARGE NOTE NURSING/CASE MANAGEMENT/SOCIAL WORK - FINANCIAL ASSISTANCE
[facility] provides services at a reduced cost to those who are determined to be eligible through [facility]’s financial assistance program. Information regarding [facility]’s financial assistance program can be found by going to [email or web address] or by calling [phone].

## 2024-10-29 NOTE — PHARMACOTHERAPY INTERVENTION NOTE - COMMENTS
Counseled patient on the following inpatient/discharge medications names (brand/generic), indication, and possible side effects:  labetalol 100mg TID (HTN)  lisinopril 20mg daily (HTN)  aspirin 81mg daily  plavix 75mg daily x 3 weeks (last dose 11/14)  atorvastatin 80mg daily    Patient was provided with a medication card for their new medication. Patient questions and concerns were answered and addressed. Patient demonstrated understanding.  Pt requesting meds be sent to her [facility] pharmacy.     Charles LipscombD, [facility]  Clinical Pharmacy Specialist  Teams (preferred) or [phone]  Counseled patient on the following inpatient/discharge medications names (brand/generic), indication, and possible side effects:  labetalol 100mg TID (HTN)  lisinopril 20mg daily (HTN)  aspirin 81mg daily  plavix 75mg daily x 3 weeks (last dose 11/14)  atorvastatin 80mg daily    Patient was provided with a medication card for their new medication. Patient questions and concerns were answered and addressed. Patient demonstrated understanding.  Pt requesting meds be sent to her [facility] pharmacy. As per pharmacy, all meds covered and all copays are <$10. Lantus is $7.65/ mo. Mounjaro requires prior auth (as do Trulicity and Ozempic) and PA will be initiated today. Freestyle Ki RX sent to pharmacy and covered reader is $65 and sensor is $75 but product is on backorder; as per pharmacist at [facility], Dexcom is covered for $0 and is easier to be ordered - asked ACP to resend RX.     Ana Lilia Salinas, CharlesD, BCPS  Clinical Pharmacy Specialist  Teams (preferred) or [phone]

## 2024-10-29 NOTE — PROGRESS NOTE ADULT - NS ATTEND AMEND GEN_ALL_CORE FT
Patient care and plan discussed and reviewed with Advanced Care Provider. Plan as outlined above edited by me to reflect our discussion. Thirty five minutes spent on encounter, of which more than fifty percent of the encounter was spent on counseling and/or coordinating care by the attending physician.

## 2024-10-29 NOTE — PHARMACOTHERAPY INTERVENTION NOTE - COMMENTS
Educated patient on reason for insulin use (high A1C, meaning of A1C, A1C goal), pathophysiology/role of insulin in our body, complications of uncontrolled DM and signs of hypoglycemia and what to do in event of hypoglycemia.     Demonstrated to patient how to use insulin pen injection using demo pens. 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 Patient was able to perform teach back and demonstrate proper technique.  Overall, patient feels confident and demonstrates competency in preparing for, and administration of, insulin. Pt knows to rotate injection site. Counseled patient on how to obtain fingerstick measurements using demo lancet, lancet device, test strips and glucometer (practiced with [employer] but patient may use any brand covered by their insurance upon dc). Instructed to purchase lancing device and glucometer and test fingersticks three times a day - Instruction provided to document blood sugars in a log/diary and follow up with provider for adjustments. Pt understands importance of compliance. Patient's questions and concerns were addressed.    Final doses TBD pending endocrine recs.  Counseled patient on Lantus dosing (SQ once daily), metformin titration as per endo (500mg BID x 4 weeks then 1g BID) and mounjaro SQ once weekly.   As per patient, she has same insurance as her  and he was previously on lantus and mounjaro - please try sending rx for these two for coverage to pt's preferred [facility] for coverage.     Ana Lilia Salinas, PharmD, [facility]  Clinical Pharmacy Specialist  Teams (preferred) or [phone]

## 2024-10-29 NOTE — CHART NOTE - NSCHARTNOTEFT_GEN_A_CORE
BREEZY HAND  Gender: Female  48y  [facility] 4MON 417 D1    Patient not seen today, chart reviewed.     POCT Blood Glucose:  174 mg/dL (10-29-24 @ 08:15)  161 mg/dL (10-29-24 @ 02:08)  149 mg/dL (10-28-24 @ 21:42)  119 mg/dL (10-28-24 @ 16:39)  180 mg/dL (10-28-24 @ 13:08)  159 mg/dL (10-28-24 @ 11:44)      eMAR:atorvastatin   80 milliGRAM(s) Oral (10-28-24 @ 21:55)    insulin glargine Injectable (LANTUS)   26 Unit(s) SubCutaneous (10-28-24 @ 21:55)    insulin lispro (ADMELOG) corrective regimen sliding scale   1 Unit(s) SubCutaneous (10-29-24 @ 08:30)   1 Unit(s) SubCutaneous (10-28-24 @ 13:10)    insulin lispro Injectable (ADMELOG)   13 Unit(s) SubCutaneous (10-29-24 @ 08:30)   13 Unit(s) SubCutaneous (10-28-24 @ 17:09)   13 Unit(s) SubCutaneous (10-28-24 @ 13:10)    Endocrinology following patient for T2DM management. In the last 24hrs BG levels have been 119-174mg/dL. No hypoglycemia. FBG stable. Patient is planning for discharge home today.     Discharge Planning:   - Patient to go home on Basal insulin Lantus 26u QHS plus Metformin 500mg twice daily x4 weeks then increased to 1g twice daily outpatient. Please also start a GLP1 once weekly, patient reports her insurance covers Mounjaro (2.5mg subq once weekly) to be titrated up outpatient as tolerated.   - Please write Rxs for: Solo Star Insulin pen/Lakeisha insulin pen needles/glucose meter/strips/lancets. Can prescribe any insulin analog equivalent cover by pt's insurance   - Patient would benefit from CGM- please place order for CGM (Dexcom G7 or Freestyle Ki 3 whichever is covered by patient insurance) sensor x3.    - Pt to report to PCP or Endocrinologist if BG <70mg/dL x1 or >200mg/dL consistently or >400mg/dL x1.    - Pt wishes to f/u with Dr Jennings in [facility]> will send email to practice to coordinate f/u apt, please also provide patient with Endocrine practice info to call if she does not hear back in the next week: [phone] [address] [facility], [address] [place] [address]       Pt will need RX for basal insulin pen (ie. Basaglar, Lantus, Tresiba, Toujeo, Levemir) and bolus insulin pen (ie. humalog/novolog/admelog) depending on insurance coverage; please send test scripts to see which is covered. Please send prescriptions for diabetes supplies (glucometer, test strips, lancets, alcohol swabs, insulin pen needles). BREEZY HAND  Gender: Female  48y  [facility] 4MON 417 D1    Patient not seen today, chart reviewed.     POCT Blood Glucose:  174 mg/dL (10-29-24 @ 08:15)  161 mg/dL (10-29-24 @ 02:08)  149 mg/dL (10-28-24 @ 21:42)  119 mg/dL (10-28-24 @ 16:39)  180 mg/dL (10-28-24 @ 13:08)  159 mg/dL (10-28-24 @ 11:44)      eMAR:atorvastatin   80 milliGRAM(s) Oral (10-28-24 @ 21:55)    insulin glargine Injectable (LANTUS)   26 Unit(s) SubCutaneous (10-28-24 @ 21:55)    insulin lispro (ADMELOG) corrective regimen sliding scale   1 Unit(s) SubCutaneous (10-29-24 @ 08:30)   1 Unit(s) SubCutaneous (10-28-24 @ 13:10)    insulin lispro Injectable (ADMELOG)   13 Unit(s) SubCutaneous (10-29-24 @ 08:30)   13 Unit(s) SubCutaneous (10-28-24 @ 17:09)   13 Unit(s) SubCutaneous (10-28-24 @ 13:10)    Endocrinology following patient for T2DM management. In the last 24hrs BG levels have been 119-174mg/dL. No hypoglycemia. FBG stable. Patient is planning for discharge home today.     Discharge Planning:   - Patient to go home on Basal insulin Lantus 26u QHS plus Metformin 500mg twice daily x4 weeks then increased to 1g twice daily outpatient. Please also start a GLP1 once weekly, patient reports her insurance covers Mounjaro (2.5mg subq once weekly) to be titrated up outpatient as tolerated.   - Please write Rxs for: Solo Star Insulin pen/Lakeisha insulin pen needles/glucose meter/strips/lancets. Can prescribe any insulin analog equivalent cover by pt's insurance   - Patient would benefit from CGM- please place order for CGM (Dexcom G7 or Freestyle Ki 3 whichever is covered by patient insurance) sensor x3.    - Pt to report to PCP or Endocrinologist if BG <70mg/dL x1 or >200mg/dL consistently or >400mg/dL x1.    - Pt wishes to f/u with Dr Jennings in [facility] practice> will send email to practice to coordinate f/u apt, please also provide patient with Endocrine practice info to call if she does not hear back in the next week: [phone] [address] [facility], [address] [place] [address]       Pt will need RX for basal insulin pen (ie. Basaglar, Lantus, Tresiba, Toujeo, Levemir) depending on insurance coverage; please send test scripts to see which is covered. Please send prescriptions for diabetes supplies (glucometer, test strips, lancets, alcohol swabs, insulin pen needles).

## 2024-10-29 NOTE — DISCHARGE NOTE NURSING/CASE MANAGEMENT/SOCIAL WORK - PATIENT PORTAL LINK FT
You can access the FollowMyHealth Patient Portal offered by [facility] by registering at the following website: [email or web address]. By joining "[employer]’s FollowMyHealth portal, you will also be able to view your health information using other applications (apps) compatible with our system.

## 2024-10-29 NOTE — DISCHARGE NOTE NURSING/CASE MANAGEMENT/SOCIAL WORK - NSDCPEFALRISK_GEN_ALL_CORE
For information on Fall & Injury Prevention, visit: [email or web address] OR  [email or web address] OR  https://www.cdc.gov/steadi/patient.html

## 2024-10-29 NOTE — PROGRESS NOTE ADULT - REASON FOR ADMISSION
Slurred speech, imbalance

## 2024-10-29 NOTE — PROGRESS NOTE ADULT - SUBJECTIVE AND OBJECTIVE BOX
Patient with right basal ganglia infarct, age indeterminant left lacunar infarct  Slurred speech improved  PAST MEDICAL & SURGICAL HISTORY:  HTN (hypertension)        Allergies    No Known Allergies    Intolerances      Social History:    Medications:  acetaminophen     Tablet .. 650 milliGRAM(s) Oral every 6 hours PRN Temp greater or equal to 38C (100.4F), Mild Pain (1 - 3)  aluminum hydroxide/magnesium hydroxide/simethicone Suspension 30 milliLiter(s) Oral every 4 hours PRN Dyspepsia  aspirin enteric coated 81 milliGRAM(s) Oral daily  atorvastatin 80 milliGRAM(s) Oral at bedtime  clopidogrel Tablet 75 milliGRAM(s) Oral daily  dextrose 5%. 1000 milliLiter(s) IV Continuous <Continuous>  dextrose 5%. 1000 milliLiter(s) IV Continuous <Continuous>  dextrose 50% Injectable 25 Gram(s) IV Push once  dextrose 50% Injectable 25 Gram(s) IV Push once  dextrose 50% Injectable 12.5 Gram(s) IV Push once  dextrose Oral Gel 15 Gram(s) Oral once PRN Blood Glucose LESS THAN 70 milliGRAM(s)/deciliter  enoxaparin Injectable 40 milliGRAM(s) SubCutaneous every 24 hours  glucagon  Injectable 1 milliGRAM(s) IntraMuscular once  influenza   Vaccine 0.5 milliLiter(s) IntraMuscular once  insulin glargine Injectable (LANTUS) 26 Unit(s) SubCutaneous at bedtime  insulin lispro (ADMELOG) corrective regimen sliding scale   SubCutaneous <User Schedule>  insulin lispro (ADMELOG) corrective regimen sliding scale   SubCutaneous three times a day before meals  insulin lispro Injectable (ADMELOG) 13 Unit(s) SubCutaneous three times a day before meals  labetalol 100 milliGRAM(s) Oral three times a day  lisinopril 10 milliGRAM(s) Oral daily  melatonin 3 milliGRAM(s) Oral at bedtime PRN Insomnia  nicotine -  14 mG/24Hr(s) Patch 1 Patch Transdermal daily  ondansetron Injectable 4 milliGRAM(s) IV Push every 8 hours PRN Nausea and/or Vomiting  polyethylene glycol 3350 17 Gram(s) Oral daily PRN Constipation  senna 2 Tablet(s) Oral at bedtime    Labs:            Radiology:             ROS:  Patient comfortable without distress  No SOB or chest pain  No palpitation  No abdominal pain, diarrhaea or constipation  No weakness of extremities  No skin changes or swelling of legs  Rest of the comprehensive ROS was negative  Vital Signs Last 24 Hrs  T(C): 36.4 (29 Oct 2024 04:04), Max: 37.2 (28 Oct 2024 14:27)  T(F): 97.6 (29 Oct 2024 04:04), Max: 98.9 (28 Oct 2024 14:27)  HR: 72 (29 Oct 2024 04:04) (72 - 92)  BP: 150/82 (29 Oct 2024 04:04) (139/83 - 168/83)  BP(mean): --  RR: 18 (29 Oct 2024 04:04) (18 - 18)  SpO2: 99% (29 Oct 2024 04:04) (96% - 99%)    Parameters below as of 29 Oct 2024 04:04  Patient On (Oxygen Delivery Method): room air        Physical exam:  Patient alert and oriented  No distress  CVS: S1, S2 regular or murmur  Chest: bilateral breath sound without rales  Abdomen: soft, not tender, no organomegaly or masses  CNS: No focal neuro deficit  Musculoskeletal:  Normal range of motion  Skin: No rash    Assessment and Plan:

## 2024-10-29 NOTE — PROGRESS NOTE ADULT - ASSESSMENT
48F with HTN, pre diabetes with CVA, likely small vessel disease per Neuro  APLS panel was ordered as there is association with arterial thrombosis; no rheumatological illnesses, 1 prior miscarriage. Yanique deepika lupus profile unremarkable  No prior hx of VTE or Family hx of VTE; remainder of hypercoaguable conditions are more associated with venous thromboembolism but was ordered for completeness  No evidence of malignancy on Ct c/a/p  Cardiology following, for ILR vs monitor  Antiplatelets per Neurology  follow pending labs  
48y F pmh prediabetes, HTN p/w slurred speech, left hand weakness, loss of balance, dysphagia x 2d found to have acute right basal ganglia infarct and left lacunar infarct being a/f further tx     - CXR: clear   - CTH: acute right basal ganglia infarct and age indeterminate left lacunar infarct  - CTA H/Neck:  no significant stenosis or vascular lesion   - NIHSS: 0.   LKN: 2d ago        Acute CVA     Autoimmune work up  Neuro following   BP control      MRI brain Acute Basal ganglia infract - TTE (ordered), Telemetry   - VS & Neuro checks q4h  - Fall & aspiration precautions  - Passed dysphagia screen but still endorsing discomfort w/ swallowing will consult  SLP    - PT/OT consult (ordered)     D/C planning           
47 y/o F w/ newly diagnosed Type 2 DM now complicated by CVA, HTN, HLD admitted with slurred speech (high risk patient with severely uncontrolled diabetes with A1c of 11.6% at high risk of CAD and CVA now with CVA with high medical complexity and high level decision-making). Patient is doing well, eating full meals and tolerating POs. No hypoglycemia. FBG elevated- will adjust Lantus HS. Noted post-prandial BGs >180mg/dL consistently- will adjust nutritional insulin for BG goal 100-180mg/dL. Patient needs insulin pen injection and BG check teaching by RN prior to discharge- patient is familiar with performing tasks. Patient is interested in CGM and would also like to f/u with our clinic outpatient.     Met with patient and reviewed the following:    -A1c LEVEL: Present and goal  -Blood glucose goals: 100s to 150s as out pt  -Glucose monitoring frequency: ac and hs  -Hypoglycemia prevention, detection and treatment  -Healthy eating and portion control  -Insulin(s) action, time of administration and side effects  -Importance of follow up care  Patient able to verbalize understanding regarding the need for glucose monitoring, diet, DM meds and follow up care.       #Uncontrolled Type 2 Diabetes Mellitus  A1C with Estimated Average Glucose Result: 11.7 % (10-25-24 @ 07:13)  A1C with Estimated Average Glucose Result: 11.6 % (10-24-24 @ 13:33)    Home regimen: none, hx of pre-DM  Has not followed up with PCP in 5 years
47 y/o F w/ newly diagnosed uncontrolled untreated Type 2 DM (A1C 11.6%) now complicated by CVA. Also h/o HTN, HLD. Here with progressive slurred speech left hand weakness, loss of balance with falling to the left, and choking on liquids x 2 days. found to have CVA. Symptoms improved and now tolerating POs with BG levels still above goal. No hypoglycemia. Increasing prandial insulin doses to keep BG goal 100 to 180s without hypoglycemia.    Met with patient and reviewed/reinforced the following:  -DM complications  -A1c LEVEL: Present and goal  -Blood glucose goals: 100s to 150s as out pt  -Glucose monitoring frequency: at least fasting and hs  -Healthy eating and portion control. Reviewed "My Plate" in depth  -Insulin(s) action, time of administration and side effects. Basal insulin. Reports she know how to use an insulin pen because her  used to be on insulin.   -Importance of follow up care. Wants for f/u with Dr Jennings in [place]  > her  follows with this endo.   -Smoking cessation> on patch now. Risk for recurrence of CVA in setting of DM and smoking.   Patient able to verbalize understanding regarding the need for glucose monitoring, diet, DM meds and follow up care.     A1C with Estimated Average Glucose Result: 11.7 % (10-25-24 @ 07:13)  A1C with Estimated Average Glucose Result: 11.6 % (10-24-24 @ 13:33)    Home regimen: none, hx of pre-DM  Has not followed up with PCP in 5 years
48y F pmh prediabetes, HTN p/w slurred speech, left hand weakness, loss of balance, dysphagia x 2d found to have acute right basal ganglia infarct and left lacunar infarct being a/f further tx     - CXR: clear   - CTH: acute right basal ganglia infarct and age indeterminate left lacunar infarct  - CTA H/Neck:  no significant stenosis or vascular lesion   - NIHSS: 0.   LKN: 2d ago        Acute CVA     Autoimmune work up  Neuro following   BP control      MRI brain Acute Basal ganglia infract - TTE (ordered), Telemetry   - VS & Neuro checks q4h  - Fall & aspiration precautions  - Passed dysphagia screen but still endorsing discomfort w/ swallowing will consult  SLP    - PT/OT consult (ordered)         
48y F pmh prediabetes, HTN p/w slurred speech, left hand weakness, loss of balance, dysphagia x 2d found to have acute right basal ganglia infarct and left lacunar infarct being a/f further tx     - CXR: clear   - CTH: acute right basal ganglia infarct and age indeterminate left lacunar infarct  - CTA H/Neck:  no significant stenosis or vascular lesion   - NIHSS: 0.   LKN: 2d ago        Acute CVA     Autoimmune work up  Neuro following   BP control      MRI brain Acute Basal ganglia infract - TTE (ordered), Telemetry   - VS & Neuro checks q4h  - Fall & aspiration precautions  - Passed dysphagia screen but still endorsing discomfort w/ swallowing will consult  SLP    - PT/OT consult (ordered)     D/C planning           
48y F pmh prediabetes, HTN p/w slurred speech, left hand weakness, loss of balance, dysphagia x 2d found to have acute right basal ganglia infarct and left lacunar infarct being a/f further tx     - CXR: clear   - CTH: acute right basal ganglia infarct and age indeterminate left lacunar infarct  - CTA H/Neck:  no significant stenosis or vascular lesion   - NIHSS: 0.   LKN: 2d ago        Acute CVA     Autoimmune work up  Neuro following   BP control      MRI brain Acute Basal ganglia infract - TTE (ordered), Telemetry   - VS & Neuro checks q4h  - Fall & aspiration precautions  - Passed dysphagia screen but still endorsing discomfort w/ swallowing will consult  SLP    - PT/OT consult (ordered)

## 2024-10-29 NOTE — PROGRESS NOTE ADULT - SUBJECTIVE AND OBJECTIVE BOX
DATE OF SERVICE: 10-29-24 @ 15:53    Patient is a 48y old  Female who presents with a chief complaint of Slurred speech, imbalance (29 Oct 2024 11:40)      INTERVAL HISTORY: Feels ok.     REVIEW OF SYSTEMS:  CONSTITUTIONAL: No weakness  EYES/ENT: No visual changes;  No throat pain   NECK: No pain or stiffness  RESPIRATORY: No cough, wheezing; No shortness of breath  CARDIOVASCULAR: No chest pain or palpitations  GASTROINTESTINAL: No abdominal  pain. No nausea, vomiting, or hematemesis  GENITOURINARY: No dysuria, frequency or hematuria  NEUROLOGICAL: No stroke like symptoms  SKIN: No rashes     TELEMETRY Personally reviewed: SR   	  MEDICATIONS:  labetalol 100 milliGRAM(s) Oral three times a day  lisinopril 10 milliGRAM(s) Oral daily        PHYSICAL EXAM:  T(C): 37.1 (10-29-24 @ 13:45), Max: 37.1 (10-29-24 @ 13:45)  HR: 82 (10-29-24 @ 13:45) (72 - 92)  BP: 138/67 (10-29-24 @ 13:45) (138/67 - 165/98)  RR: 18 (10-29-24 @ 13:45) (18 - 18)  SpO2: 98% (10-29-24 @ 13:45) (96% - 99%)  Wt(kg): --  I&O's Summary        Appearance: In no distress	  HEENT:    PERRL, EOMI	  Cardiovascular:  S1 S2, No JVD  Respiratory: Lungs clear to auscultation	  Gastrointestinal:  Soft, Non-tender, + BS	  Vascularature:  No edema of LE  Psychiatric: Appropriate affect   Neuro: no acute focal deficits           Labs personally reviewed      ASSESSMENT/PLAN: 	      48y F pmh prediabetes and HTN not on medications p/w slurred speech, left hand weakness, loss of balance with falling to the left, and choking on liquids since waking up 2 days ago. Symptoms have gradually improved and now only complains of mild slurred speech, difficulty swallowing, and left finger clumsiness. Also noticed some difficulty with reading, skips over some words when reading fast. No gait disturbance. Has not seen a PCP or gone for medical check up in a long time. Patient denies history of stroke or similar symptoms. Patient is a current smoker for 20 years, 5-6 cigarettes a day.     1. Acute CVA  - CT Head: acute/subacute 1.8 cm infarction within the RIGHT basal ganglia.  No associated hemorrhage seen.  Age indeterminate 5 mm lacunar infarction in the LEFT kisha  - per Neuro recs ~ ASA 81 mg and plavix 75mg QD for 21 days then continue with aspirin only  - c/w Atorvastatin 80mg qd titrate to LDL <70  - EP called: plan for 30 day Zio patch    2. HTN  - bp systolic 140s-160s, diastolic 80s-90s  - c/w labetolol 100 TID  - recommend increase lisinopril from 10mg qd to 20mg qd if ok with Neurology    3. DM2  - c/w sliding scale and standing insulin per Endo recs    4. Smoking cessation  - lifestyle modification and smoking cessation encouraged        LILLIANA Aviles DO [facility]  Cardiovascular Medicine  [address] [place] [address], [address]  Available through call or text on [employer]  Office: [phone]   DATE OF SERVICE: 10-29-24 @ 15:53    Patient is a 48y old  Female who presents with a chief complaint of Slurred speech, imbalance (29 Oct 2024 11:40)      INTERVAL HISTORY: Feels ok.     REVIEW OF SYSTEMS:  CONSTITUTIONAL: No weakness  EYES/ENT: No visual changes;  No throat pain   NECK: No pain or stiffness  RESPIRATORY: No cough, wheezing; No shortness of breath  CARDIOVASCULAR: No chest pain or palpitations  GASTROINTESTINAL: No abdominal  pain. No nausea, vomiting, or hematemesis  GENITOURINARY: No dysuria, frequency or hematuria  NEUROLOGICAL: No stroke like symptoms  SKIN: No rashes     TELEMETRY Personally reviewed: SR   	  MEDICATIONS:  labetalol 100 milliGRAM(s) Oral three times a day  lisinopril 10 milliGRAM(s) Oral daily        PHYSICAL EXAM:  T(C): 37.1 (10-29-24 @ 13:45), Max: 37.1 (10-29-24 @ 13:45)  HR: 82 (10-29-24 @ 13:45) (72 - 92)  BP: 138/67 (10-29-24 @ 13:45) (138/67 - 165/98)  RR: 18 (10-29-24 @ 13:45) (18 - 18)  SpO2: 98% (10-29-24 @ 13:45) (96% - 99%)  Wt(kg): --  I&O's Summary        Appearance: In no distress	  HEENT:    PERRL, EOMI	  Cardiovascular:  S1 S2, No JVD  Respiratory: Lungs clear to auscultation	  Gastrointestinal:  Soft, Non-tender, + BS	  Vascularature:  No edema of LE  Psychiatric: Appropriate affect   Neuro: no acute focal deficits           Labs personally reviewed      ASSESSMENT/PLAN: 	    48y F pmh prediabetes and HTN not on medications p/w slurred speech, left hand weakness, loss of balance with falling to the left, and choking on liquids since waking up 2 days ago. Symptoms have gradually improved and now only complains of mild slurred speech, difficulty swallowing, and left finger clumsiness. Also noticed some difficulty with reading, skips over some words when reading fast. No gait disturbance. Has not seen a PCP or gone for medical check up in a long time. Patient denies history of stroke or similar symptoms. Patient is a current smoker for 20 years, 5-6 cigarettes a day.     1. Acute CVA  - CT Head: acute/subacute 1.8 cm infarction within the RIGHT basal ganglia.  No associated hemorrhage seen.  Age indeterminate 5 mm lacunar infarction in the LEFT kisha  - per Neuro recs ~ ASA 81 mg and plavix 75mg QD for 21 days then continue with aspirin only  - c/w Atorvastatin 80mg qd titrate to LDL <70  - EP called: plan for 30 day Zio patch    2. HTN  - bp systolic 140s-160s, diastolic 80s-90s  - c/w labetolol 100 TID  - recommend increase lisinopril from 10mg qd to 20mg qd if ok with Neurology    3. DM2  - c/w sliding scale and standing insulin per Endo recs    4. Smoking cessation  - lifestyle modification and smoking cessation encouraged        LILLIANA Aviles DO [facility]  Cardiovascular Medicine  [address] [place] [address], [address]  Available through call or text on [employer]  Office: [phone]

## 2024-10-30 LAB
AT III ACT/NOR PPP CHRO: 85 % — SIGNIFICANT CHANGE UP (ref 85–135)
DRVVT RATIO: 0.93 RATIO — SIGNIFICANT CHANGE UP (ref 0–1.21)
DRVVT SCREEN TO CONFIRM RATIO: SIGNIFICANT CHANGE UP
PROT C ACT/NOR PPP: 102 % — SIGNIFICANT CHANGE UP (ref 74–150)
PROT S FREE AG PPP IA-ACNC: 60 % — LOW (ref 61–131)

## 2024-10-31 LAB
DNA PLOIDY SPEC FC-IMP: SIGNIFICANT CHANGE UP
PTR INTERPRETATION: SIGNIFICANT CHANGE UP

## 2024-11-04 ENCOUNTER — NON-APPOINTMENT (OUTPATIENT)
Age: 49
End: 2024-11-04

## 2024-11-04 NOTE — CHART NOTE - NSCHARTNOTEFT_GEN_A_CORE
Patient was outreached but did not answer. A voicemail was left for the patient to return our call. [phone]

## 2024-11-04 NOTE — CHART NOTE - NSCHARTNOTESELECT_GEN_ALL_CORE
Hypertensive/Event Note
Neurology/Event Note
Endocrinology/Event Note
Endocrinology/Event Note
Event Note
dc appt/Event Note

## 2024-11-05 ENCOUNTER — NON-APPOINTMENT (OUTPATIENT)
Age: 49
End: 2024-11-05

## 2024-11-12 ENCOUNTER — NON-APPOINTMENT (OUTPATIENT)
Age: 49
End: 2024-11-12

## 2024-11-12 ENCOUNTER — APPOINTMENT (OUTPATIENT)
Dept: NEUROSURGERY | Facility: CLINIC | Age: 49
End: 2024-11-12
Payer: COMMERCIAL

## 2024-11-12 VITALS
BODY MASS INDEX: 39.1 KG/M2 | HEIGHT: 68 IN | RESPIRATION RATE: 18 BRPM | HEART RATE: 90 BPM | OXYGEN SATURATION: 99 % | WEIGHT: 258 LBS

## 2024-11-12 VITALS — DIASTOLIC BLOOD PRESSURE: 93 MMHG | SYSTOLIC BLOOD PRESSURE: 180 MMHG

## 2024-11-12 DIAGNOSIS — Z82.49 FAMILY HISTORY OF ISCHEMIC HEART DISEASE AND OTHER DISEASES OF THE CIRCULATORY SYSTEM: ICD-10-CM

## 2024-11-12 DIAGNOSIS — Z78.9 OTHER SPECIFIED HEALTH STATUS: ICD-10-CM

## 2024-11-12 DIAGNOSIS — F17.200 NICOTINE DEPENDENCE, UNSPECIFIED, UNCOMPLICATED: ICD-10-CM

## 2024-11-12 PROCEDURE — 99205 OFFICE O/P NEW HI 60 MIN: CPT

## 2024-11-13 RX ORDER — CETIRIZINE HYDROCHLORIDE 10 MG/1
10 TABLET, COATED ORAL
Refills: 0 | Status: ACTIVE | COMMUNITY

## 2024-11-13 RX ORDER — ACETAMINOPHEN 500 MG
500 TABLET ORAL
Refills: 0 | Status: ACTIVE | COMMUNITY

## 2024-11-13 RX ORDER — METFORMIN HYDROCHLORIDE 500 MG/1
500 TABLET, COATED ORAL
Refills: 0 | Status: ACTIVE | COMMUNITY

## 2024-11-13 RX ORDER — ONDANSETRON HYDROCHLORIDE 4 MG/1
TABLET, FILM COATED ORAL
Refills: 0 | Status: ACTIVE | COMMUNITY

## 2024-11-13 RX ORDER — VITAMIN K2 90 MCG
1000 CAPSULE ORAL
Refills: 0 | Status: ACTIVE | COMMUNITY

## 2024-11-13 RX ORDER — ATORVASTATIN CALCIUM 80 MG/1
80 TABLET, FILM COATED ORAL
Refills: 0 | Status: ACTIVE | COMMUNITY

## 2024-11-13 RX ORDER — CLOPIDOGREL BISULFATE 75 MG/1
75 TABLET, FILM COATED ORAL
Refills: 0 | Status: ACTIVE | COMMUNITY

## 2024-11-13 RX ORDER — INSULIN GLARGINE 100 [IU]/ML
100 INJECTION, SOLUTION SUBCUTANEOUS
Refills: 0 | Status: ACTIVE | COMMUNITY

## 2024-11-13 RX ORDER — ASPIRIN 81 MG
81 TABLET, DELAYED RELEASE (ENTERIC COATED) ORAL
Refills: 0 | Status: ACTIVE | COMMUNITY

## 2024-11-13 RX ORDER — LISINOPRIL 10 MG/1
10 TABLET ORAL
Refills: 0 | Status: ACTIVE | COMMUNITY

## 2024-11-13 RX ORDER — LABETALOL HYDROCHLORIDE 100 MG/1
100 TABLET, FILM COATED ORAL
Refills: 0 | Status: ACTIVE | COMMUNITY

## 2024-11-22 ENCOUNTER — APPOINTMENT (OUTPATIENT)
Dept: ELECTROPHYSIOLOGY | Facility: CLINIC | Age: 49
End: 2024-11-22
Payer: COMMERCIAL

## 2024-11-22 ENCOUNTER — NON-APPOINTMENT (OUTPATIENT)
Age: 49
End: 2024-11-22

## 2024-11-22 ENCOUNTER — APPOINTMENT (OUTPATIENT)
Dept: ELECTROPHYSIOLOGY | Facility: CLINIC | Age: 49
End: 2024-11-22

## 2024-11-22 VITALS — SYSTOLIC BLOOD PRESSURE: 172 MMHG | DIASTOLIC BLOOD PRESSURE: 103 MMHG | OXYGEN SATURATION: 100 % | HEART RATE: 99 BPM

## 2024-11-22 DIAGNOSIS — I63.9 CEREBRAL INFARCTION, UNSPECIFIED: ICD-10-CM

## 2024-11-22 PROCEDURE — 93000 ELECTROCARDIOGRAM COMPLETE: CPT

## 2024-11-22 PROCEDURE — 99214 OFFICE O/P EST MOD 30 MIN: CPT | Mod: 25

## 2024-11-27 ENCOUNTER — APPOINTMENT (OUTPATIENT)
Dept: ENDOCRINOLOGY | Facility: CLINIC | Age: 49
End: 2024-11-27

## 2024-12-07 PROCEDURE — 93244 EXT ECG>48HR<7D REV&INTERPJ: CPT

## 2024-12-16 ENCOUNTER — NON-APPOINTMENT (OUTPATIENT)
Age: 49
End: 2024-12-16

## 2025-01-16 ENCOUNTER — APPOINTMENT (OUTPATIENT)
Dept: ENDOCRINOLOGY | Facility: CLINIC | Age: 50
End: 2025-01-16

## 2025-01-16 VITALS
SYSTOLIC BLOOD PRESSURE: 147 MMHG | WEIGHT: 257 LBS | DIASTOLIC BLOOD PRESSURE: 85 MMHG | TEMPERATURE: 97.9 F | OXYGEN SATURATION: 100 % | HEIGHT: 68 IN | HEART RATE: 130 BPM | BODY MASS INDEX: 38.95 KG/M2

## 2025-01-16 DIAGNOSIS — E11.49 TYPE 2 DIABETES MELLITUS WITH OTHER DIABETIC NEUROLOGICAL COMPLICATION: ICD-10-CM

## 2025-01-16 PROCEDURE — 99214 OFFICE O/P EST MOD 30 MIN: CPT

## 2025-01-16 PROCEDURE — G2211 COMPLEX E/M VISIT ADD ON: CPT | Mod: NC

## 2025-01-16 RX ORDER — TIRZEPATIDE 5 MG/.5ML
5 INJECTION, SOLUTION SUBCUTANEOUS
Qty: 3 | Refills: 3 | Status: ACTIVE | COMMUNITY
Start: 2025-01-16 | End: 1900-01-01

## 2025-01-16 RX ORDER — AMLODIPINE BESYLATE VALSARTAN HYDROCHLOROTHIAZIDE 5; 12.5; 16 MG/1; MG/1; MG/1
5-160-12.5 TABLET, FILM COATED ORAL DAILY
Refills: 0 | Status: ACTIVE | COMMUNITY

## 2025-01-23 ENCOUNTER — RX CHANGE (OUTPATIENT)
Age: 50
End: 2025-01-23

## 2025-01-23 ENCOUNTER — APPOINTMENT (OUTPATIENT)
Dept: FAMILY MEDICINE | Facility: CLINIC | Age: 50
End: 2025-01-23
Payer: COMMERCIAL

## 2025-01-23 DIAGNOSIS — E11.49 TYPE 2 DIABETES MELLITUS WITH OTHER DIABETIC NEUROLOGICAL COMPLICATION: ICD-10-CM

## 2025-01-23 PROCEDURE — 99214 OFFICE O/P EST MOD 30 MIN: CPT | Mod: 93

## 2025-01-23 PROCEDURE — 99213 OFFICE O/P EST LOW 20 MIN: CPT | Mod: 93

## 2025-01-23 PROCEDURE — 98012 SYNCH AUDIO-ONLY EST SF 10: CPT

## 2025-01-23 RX ORDER — BLOOD-GLUCOSE,RECEIVER,CONT
EACH MISCELLANEOUS
Qty: 1 | Refills: 1 | Status: ACTIVE | COMMUNITY
Start: 2025-01-16 | End: 1900-01-01

## 2025-01-23 RX ORDER — BLOOD-GLUCOSE SENSOR
EACH MISCELLANEOUS
Qty: 9 | Refills: 4 | Status: ACTIVE | COMMUNITY
Start: 2025-01-16

## 2025-02-25 ENCOUNTER — NON-APPOINTMENT (OUTPATIENT)
Age: 50
End: 2025-02-25

## 2025-02-25 ENCOUNTER — APPOINTMENT (OUTPATIENT)
Dept: NEUROSURGERY | Facility: CLINIC | Age: 50
End: 2025-02-25
Payer: COMMERCIAL

## 2025-02-25 VITALS
WEIGHT: 257 LBS | BODY MASS INDEX: 38.95 KG/M2 | HEART RATE: 121 BPM | HEIGHT: 68 IN | SYSTOLIC BLOOD PRESSURE: 144 MMHG | OXYGEN SATURATION: 100 % | DIASTOLIC BLOOD PRESSURE: 82 MMHG | RESPIRATION RATE: 17 BRPM

## 2025-02-25 PROCEDURE — 99214 OFFICE O/P EST MOD 30 MIN: CPT

## 2025-02-28 ENCOUNTER — APPOINTMENT (OUTPATIENT)
Dept: ENDOCRINOLOGY | Facility: CLINIC | Age: 50
End: 2025-02-28

## 2025-04-16 ENCOUNTER — APPOINTMENT (OUTPATIENT)
Dept: ENDOCRINOLOGY | Facility: CLINIC | Age: 50
End: 2025-04-16

## 2025-04-22 ENCOUNTER — TRANSCRIPTION ENCOUNTER (OUTPATIENT)
Age: 50
End: 2025-04-22